# Patient Record
Sex: FEMALE | Race: BLACK OR AFRICAN AMERICAN | NOT HISPANIC OR LATINO | Employment: STUDENT | URBAN - METROPOLITAN AREA
[De-identification: names, ages, dates, MRNs, and addresses within clinical notes are randomized per-mention and may not be internally consistent; named-entity substitution may affect disease eponyms.]

---

## 2023-06-12 ENCOUNTER — OFFICE VISIT (OUTPATIENT)
Dept: PRIMARY CARE | Facility: CLINIC | Age: 23
End: 2023-06-12
Payer: COMMERCIAL

## 2023-06-12 VITALS
DIASTOLIC BLOOD PRESSURE: 71 MMHG | HEART RATE: 86 BPM | HEIGHT: 60 IN | BODY MASS INDEX: 23.56 KG/M2 | SYSTOLIC BLOOD PRESSURE: 96 MMHG | WEIGHT: 120 LBS

## 2023-06-12 DIAGNOSIS — Z00.00 WELL ADULT HEALTH CHECK: Primary | ICD-10-CM

## 2023-06-12 DIAGNOSIS — Z30.41 FAMILY PLANNING, BCP (BIRTH CONTROL PILLS) MAINTENANCE: ICD-10-CM

## 2023-06-12 PROBLEM — N30.00 CYSTITIS, ACUTE: Status: ACTIVE | Noted: 2023-06-12

## 2023-06-12 PROBLEM — D64.9 ANEMIA: Status: ACTIVE | Noted: 2023-06-12

## 2023-06-12 LAB — HEPATITIS B VIRUS SURFACE AB (MIU/ML) IN SERUM: <3.1 MIU/ML

## 2023-06-12 PROCEDURE — 1036F TOBACCO NON-USER: CPT | Performed by: FAMILY MEDICINE

## 2023-06-12 PROCEDURE — 99395 PREV VISIT EST AGE 18-39: CPT | Performed by: FAMILY MEDICINE

## 2023-06-12 PROCEDURE — 86706 HEP B SURFACE ANTIBODY: CPT

## 2023-06-12 PROCEDURE — 86481 TB AG RESPONSE T-CELL SUSP: CPT

## 2023-06-12 RX ORDER — NORGESTIMATE AND ETHINYL ESTRADIOL 7DAYSX3 28
KIT ORAL
COMMUNITY
End: 2023-06-12 | Stop reason: SDUPTHER

## 2023-06-12 RX ORDER — NORGESTIMATE AND ETHINYL ESTRADIOL 7DAYSX3 28
1 KIT ORAL DAILY
Qty: 90 TABLET | Refills: 3 | Status: SHIPPED | OUTPATIENT
Start: 2023-06-12 | End: 2026-08-25

## 2023-06-12 ASSESSMENT — ENCOUNTER SYMPTOMS
NEUROLOGICAL NEGATIVE: 1
RESPIRATORY NEGATIVE: 1
CONSTITUTIONAL NEGATIVE: 1
CARDIOVASCULAR NEGATIVE: 1
MUSCULOSKELETAL NEGATIVE: 1
GASTROINTESTINAL NEGATIVE: 1

## 2023-06-12 NOTE — PROGRESS NOTES
Subjective   Patient ID: Ifeanyi Lowery is a 22 y.o. female who presents for No chief complaint on file..    HPI well check     Review of Systems   Constitutional: Negative.    HENT: Negative.     Respiratory: Negative.     Cardiovascular: Negative.    Gastrointestinal: Negative.    Musculoskeletal: Negative.    Neurological: Negative.        Objective   BP 96/71   Pulse 86   Ht 1.524 m (5')   Wt 54.4 kg (120 lb)   BMI 23.44 kg/m²     Physical Exam  Vitals reviewed.   Constitutional:       Appearance: Normal appearance. She is normal weight.   Eyes:      Extraocular Movements: Extraocular movements intact.      Conjunctiva/sclera: Conjunctivae normal.      Pupils: Pupils are equal, round, and reactive to light.   Cardiovascular:      Rate and Rhythm: Normal rate and regular rhythm.      Pulses: Normal pulses.      Heart sounds: Normal heart sounds.   Pulmonary:      Effort: Pulmonary effort is normal.      Breath sounds: Normal breath sounds.   Abdominal:      General: Bowel sounds are normal.      Palpations: Abdomen is soft.   Musculoskeletal:         General: Normal range of motion.   Skin:     General: Skin is warm and dry.   Neurological:      General: No focal deficit present.      Mental Status: She is alert and oriented to person, place, and time. Mental status is at baseline.         Assessment/Plan   Problem List Items Addressed This Visit    None  Visit Diagnoses       Well adult health check    -  Primary    Relevant Orders    Hepatitis B surface antibody    T-Spot TB    Family planning, BCP (birth control pills) maintenance        Relevant Medications    norgestimate-ethinyl estradioL (Tri-Linyah) 0.18/0.215/0.25 mg-35 mcg (28) tablet

## 2023-06-13 ENCOUNTER — TELEPHONE (OUTPATIENT)
Dept: PRIMARY CARE | Facility: CLINIC | Age: 23
End: 2023-06-13
Payer: COMMERCIAL

## 2023-06-15 LAB
NIL(NEG) CONTROL SPOT COUNT: NORMAL
PANEL A SPOT COUNT: 0
PANEL B SPOT COUNT: 0
POS CONTROL SPOT COUNT: NORMAL
T-SPOT. TB INTERPRETATION: NEGATIVE

## 2023-10-13 ENCOUNTER — OFFICE VISIT (OUTPATIENT)
Dept: PRIMARY CARE | Facility: CLINIC | Age: 23
End: 2023-10-13
Payer: COMMERCIAL

## 2023-10-13 VITALS
DIASTOLIC BLOOD PRESSURE: 82 MMHG | HEART RATE: 100 BPM | HEIGHT: 60 IN | WEIGHT: 124 LBS | SYSTOLIC BLOOD PRESSURE: 108 MMHG | BODY MASS INDEX: 24.35 KG/M2

## 2023-10-13 DIAGNOSIS — Z23 NEED FOR TDAP VACCINATION: ICD-10-CM

## 2023-10-13 DIAGNOSIS — D75.89 RED BLOOD CELL DISORDER: ICD-10-CM

## 2023-10-13 DIAGNOSIS — N92.0 MENORRHAGIA WITH REGULAR CYCLE: Primary | ICD-10-CM

## 2023-10-13 DIAGNOSIS — D50.0 IRON DEFICIENCY ANEMIA DUE TO CHRONIC BLOOD LOSS: ICD-10-CM

## 2023-10-13 DIAGNOSIS — D72.9 WHITE BLOOD CELL DISORDER: ICD-10-CM

## 2023-10-13 LAB
BASOPHILS # BLD AUTO: 0.05 X10*3/UL (ref 0–0.1)
BASOPHILS NFR BLD AUTO: 0.9 %
EOSINOPHIL # BLD AUTO: 0.09 X10*3/UL (ref 0–0.7)
EOSINOPHIL NFR BLD AUTO: 1.7 %
ERYTHROCYTE [DISTWIDTH] IN BLOOD BY AUTOMATED COUNT: 12.8 % (ref 11.5–14.5)
HCT VFR BLD AUTO: 33.4 % (ref 36–46)
HGB BLD-MCNC: 10.8 G/DL (ref 12–16)
IMM GRANULOCYTES # BLD AUTO: 0.01 X10*3/UL (ref 0–0.7)
IMM GRANULOCYTES NFR BLD AUTO: 0.2 % (ref 0–0.9)
IRON SATN MFR SERPL: 35 % (ref 25–45)
IRON SERPL-MCNC: 133 UG/DL (ref 35–150)
LYMPHOCYTES # BLD AUTO: 2.47 X10*3/UL (ref 1.2–4.8)
LYMPHOCYTES NFR BLD AUTO: 46.2 %
MCH RBC QN AUTO: 31.3 PG (ref 26–34)
MCHC RBC AUTO-ENTMCNC: 32.3 G/DL (ref 32–36)
MCV RBC AUTO: 97 FL (ref 80–100)
MONOCYTES # BLD AUTO: 0.58 X10*3/UL (ref 0.1–1)
MONOCYTES NFR BLD AUTO: 10.8 %
NEUTROPHILS # BLD AUTO: 2.15 X10*3/UL (ref 1.2–7.7)
NEUTROPHILS NFR BLD AUTO: 40.2 %
NRBC BLD-RTO: 0 /100 WBCS (ref 0–0)
PLATELET # BLD AUTO: 330 X10*3/UL (ref 150–450)
PMV BLD AUTO: 10.5 FL (ref 7.5–11.5)
RBC # BLD AUTO: 3.45 X10*6/UL (ref 4–5.2)
TIBC SERPL-MCNC: 382 UG/DL (ref 240–445)
UIBC SERPL-MCNC: 249 UG/DL (ref 110–370)
WBC # BLD AUTO: 5.4 X10*3/UL (ref 4.4–11.3)

## 2023-10-13 PROCEDURE — 1036F TOBACCO NON-USER: CPT | Performed by: FAMILY MEDICINE

## 2023-10-13 PROCEDURE — 90471 IMMUNIZATION ADMIN: CPT | Performed by: FAMILY MEDICINE

## 2023-10-13 PROCEDURE — 83540 ASSAY OF IRON: CPT

## 2023-10-13 PROCEDURE — 83550 IRON BINDING TEST: CPT

## 2023-10-13 PROCEDURE — 36415 COLL VENOUS BLD VENIPUNCTURE: CPT

## 2023-10-13 PROCEDURE — 99213 OFFICE O/P EST LOW 20 MIN: CPT | Performed by: FAMILY MEDICINE

## 2023-10-13 PROCEDURE — 90715 TDAP VACCINE 7 YRS/> IM: CPT | Performed by: FAMILY MEDICINE

## 2023-10-13 PROCEDURE — 85025 COMPLETE CBC W/AUTO DIFF WBC: CPT

## 2023-10-13 ASSESSMENT — ENCOUNTER SYMPTOMS
NEUROLOGICAL NEGATIVE: 1
CARDIOVASCULAR NEGATIVE: 1
CONSTITUTIONAL NEGATIVE: 1
RESPIRATORY NEGATIVE: 1
GASTROINTESTINAL NEGATIVE: 1
MUSCULOSKELETAL NEGATIVE: 1

## 2023-10-13 NOTE — PATIENT INSTRUCTIONS
OB-GYN    Female  Dr Eleonora Blandon -    [phone]  Dr Geeta Cardona -                  [phone]  Male  Dr Mooney -                      [phone]  Dr Meza -                   [phone]

## 2023-10-13 NOTE — PROGRESS NOTES
Subjective   Patient ID: Ifeanyi Lowery is a 23 y.o. female who presents for No chief complaint on file..    HPI heavy periods , pt stoopped bcp due to concern w possible side effects, would like to see obgyn, is not taking iron pill and last period was 7 days w hx of anemia    Review of Systems   Constitutional: Negative.    HENT: Negative.     Respiratory: Negative.     Cardiovascular: Negative.    Gastrointestinal: Negative.    Genitourinary:  Positive for menstrual problem.   Musculoskeletal: Negative.    Neurological: Negative.        Objective   /82   Pulse 100   Ht 1.524 m (5')   Wt 56.2 kg (124 lb)   BMI 24.22 kg/m²     Physical Exam  Vitals reviewed.   Constitutional:       Appearance: Normal appearance. She is normal weight.   Eyes:      Extraocular Movements: Extraocular movements intact.      Conjunctiva/sclera: Conjunctivae normal.      Pupils: Pupils are equal, round, and reactive to light.   Cardiovascular:      Rate and Rhythm: Normal rate and regular rhythm.      Pulses: Normal pulses.      Heart sounds: Normal heart sounds.   Pulmonary:      Effort: Pulmonary effort is normal.      Breath sounds: Normal breath sounds.   Abdominal:      General: Bowel sounds are normal.      Palpations: Abdomen is soft.   Musculoskeletal:         General: Normal range of motion.   Skin:     General: Skin is warm and dry.   Neurological:      General: No focal deficit present.      Mental Status: She is alert and oriented to person, place, and time. Mental status is at baseline.       Assessment/Plan   Problem List Items Addressed This Visit             ICD-10-CM    Anemia D64.9    Relevant Orders    CBC and Auto Differential    Iron and TIBC     Other Visit Diagnoses         Codes    Menorrhagia with regular cycle    -  Primary N92.0    Relevant Orders    CBC and Auto Differential    Iron and TIBC    Need for Tdap vaccination     Z23    Relevant Orders    Tdap vaccine, age 7 years and older (Completed)         Refer to obgyn

## 2023-10-13 NOTE — PROGRESS NOTES
Pt comes in bc she wants to change her bc. Pt also unsure if needs a vaccine for school? Pt stopped what she was on bc it was giving her pains in her legs. Pt states she stopped it about 3 weeks ago.

## 2023-10-16 ENCOUNTER — TELEPHONE (OUTPATIENT)
Dept: PRIMARY CARE | Facility: CLINIC | Age: 23
End: 2023-10-16
Payer: COMMERCIAL

## 2023-10-16 NOTE — TELEPHONE ENCOUNTER
Spoke with patient      ----- Message from Nelson Bernardo MD sent at 10/15/2023 11:55 AM EDT -----  Please call the patient regarding her abnormal result.  Bakari pt about anemia , it is stable from past but due to its lack of improvement I would like to refer her to dr leon  or dr madrid, hematology for eval   [phone]- for either, not an urgent consult- I put in referral

## 2024-01-23 ENCOUNTER — OFFICE VISIT (OUTPATIENT)
Dept: PRIMARY CARE | Facility: CLINIC | Age: 24
End: 2024-01-23
Payer: COMMERCIAL

## 2024-01-23 VITALS
BODY MASS INDEX: 24.35 KG/M2 | HEIGHT: 60 IN | HEART RATE: 110 BPM | DIASTOLIC BLOOD PRESSURE: 85 MMHG | WEIGHT: 124 LBS | SYSTOLIC BLOOD PRESSURE: 127 MMHG

## 2024-01-23 DIAGNOSIS — Z20.2 POSSIBLE EXPOSURE TO STD: ICD-10-CM

## 2024-01-23 DIAGNOSIS — N92.0 MENORRHAGIA WITH REGULAR CYCLE: ICD-10-CM

## 2024-01-23 DIAGNOSIS — D50.0 IRON DEFICIENCY ANEMIA DUE TO CHRONIC BLOOD LOSS: Primary | ICD-10-CM

## 2024-01-23 LAB
ALBUMIN SERPL BCP-MCNC: 4.4 G/DL (ref 3.4–5)
ALP SERPL-CCNC: 78 U/L (ref 33–110)
ALT SERPL W P-5'-P-CCNC: 19 U/L (ref 7–45)
ANION GAP SERPL CALC-SCNC: 16 MMOL/L (ref 10–20)
AST SERPL W P-5'-P-CCNC: 17 U/L (ref 9–39)
BASOPHILS # BLD AUTO: 0.04 X10*3/UL (ref 0–0.1)
BASOPHILS NFR BLD AUTO: 0.7 %
BILIRUB SERPL-MCNC: 0.3 MG/DL (ref 0–1.2)
BUN SERPL-MCNC: 9 MG/DL (ref 6–23)
CALCIUM SERPL-MCNC: 9.2 MG/DL (ref 8.6–10.6)
CHLORIDE SERPL-SCNC: 101 MMOL/L (ref 98–107)
CO2 SERPL-SCNC: 24 MMOL/L (ref 21–32)
CREAT SERPL-MCNC: 0.64 MG/DL (ref 0.5–1.05)
EGFRCR SERPLBLD CKD-EPI 2021: >90 ML/MIN/1.73M*2
EOSINOPHIL # BLD AUTO: 0.1 X10*3/UL (ref 0–0.7)
EOSINOPHIL NFR BLD AUTO: 1.8 %
ERYTHROCYTE [DISTWIDTH] IN BLOOD BY AUTOMATED COUNT: 13.5 % (ref 11.5–14.5)
GLUCOSE SERPL-MCNC: 70 MG/DL (ref 74–99)
HCT VFR BLD AUTO: 34.7 % (ref 36–46)
HGB BLD-MCNC: 11 G/DL (ref 12–16)
HIV 1+2 AB+HIV1 P24 AG SERPL QL IA: NONREACTIVE
IMM GRANULOCYTES # BLD AUTO: 0.01 X10*3/UL (ref 0–0.7)
IMM GRANULOCYTES NFR BLD AUTO: 0.2 % (ref 0–0.9)
IRON SATN MFR SERPL: 13 % (ref 25–45)
IRON SERPL-MCNC: 48 UG/DL (ref 35–150)
LYMPHOCYTES # BLD AUTO: 2.25 X10*3/UL (ref 1.2–4.8)
LYMPHOCYTES NFR BLD AUTO: 39.5 %
MCH RBC QN AUTO: 31.2 PG (ref 26–34)
MCHC RBC AUTO-ENTMCNC: 31.7 G/DL (ref 32–36)
MCV RBC AUTO: 98 FL (ref 80–100)
MONOCYTES # BLD AUTO: 0.75 X10*3/UL (ref 0.1–1)
MONOCYTES NFR BLD AUTO: 13.2 %
NEUTROPHILS # BLD AUTO: 2.54 X10*3/UL (ref 1.2–7.7)
NEUTROPHILS NFR BLD AUTO: 44.6 %
NRBC BLD-RTO: 0 /100 WBCS (ref 0–0)
PLATELET # BLD AUTO: 319 X10*3/UL (ref 150–450)
POTASSIUM SERPL-SCNC: 3.9 MMOL/L (ref 3.5–5.3)
PROT SERPL-MCNC: 7.8 G/DL (ref 6.4–8.2)
RBC # BLD AUTO: 3.53 X10*6/UL (ref 4–5.2)
SODIUM SERPL-SCNC: 137 MMOL/L (ref 136–145)
TIBC SERPL-MCNC: 359 UG/DL (ref 240–445)
UIBC SERPL-MCNC: 311 UG/DL (ref 110–370)
WBC # BLD AUTO: 5.7 X10*3/UL (ref 4.4–11.3)

## 2024-01-23 PROCEDURE — 87389 HIV-1 AG W/HIV-1&-2 AB AG IA: CPT

## 2024-01-23 PROCEDURE — 87800 DETECT AGNT MULT DNA DIREC: CPT

## 2024-01-23 PROCEDURE — 83550 IRON BINDING TEST: CPT

## 2024-01-23 PROCEDURE — 85025 COMPLETE CBC W/AUTO DIFF WBC: CPT

## 2024-01-23 PROCEDURE — 99214 OFFICE O/P EST MOD 30 MIN: CPT | Performed by: FAMILY MEDICINE

## 2024-01-23 PROCEDURE — 80053 COMPREHEN METABOLIC PANEL: CPT

## 2024-01-23 PROCEDURE — 83540 ASSAY OF IRON: CPT

## 2024-01-23 PROCEDURE — 1036F TOBACCO NON-USER: CPT | Performed by: FAMILY MEDICINE

## 2024-01-23 PROCEDURE — 36415 COLL VENOUS BLD VENIPUNCTURE: CPT

## 2024-01-23 ASSESSMENT — ENCOUNTER SYMPTOMS
NEUROLOGICAL NEGATIVE: 1
DEPRESSION: 0
LOSS OF SENSATION IN FEET: 0
CONSTITUTIONAL NEGATIVE: 1
RESPIRATORY NEGATIVE: 1
GASTROINTESTINAL NEGATIVE: 1
OCCASIONAL FEELINGS OF UNSTEADINESS: 0
CARDIOVASCULAR NEGATIVE: 1
MUSCULOSKELETAL NEGATIVE: 1

## 2024-01-23 NOTE — PROGRESS NOTES
Pt comes in for std testing. Pt states she has a new partner and wanted checked. Pt states had low rbcs and was taking otc iron and then it interacted with her ibs. So then was giving her hemorrhoid flares really bad. Pt wanted to see if there was any other kind of iron she can take besides for pills?

## 2024-01-23 NOTE — PROGRESS NOTES
Subjective   Patient ID: Ifeanyi Lowery is a 23 y.o. female who presents for No chief complaint on file..    HPI pt w anemia, concern for std exposure, menomethoragia    Review of Systems   Constitutional: Negative.    HENT: Negative.     Respiratory: Negative.     Cardiovascular: Negative.    Gastrointestinal: Negative.    Musculoskeletal: Negative.    Neurological: Negative.        Objective   /85   Pulse 110   Ht 1.524 m (5')   Wt 56.2 kg (124 lb)   BMI 24.22 kg/m²     Physical Exam  Vitals reviewed.   Constitutional:       Appearance: Normal appearance. She is normal weight.   Eyes:      Extraocular Movements: Extraocular movements intact.      Conjunctiva/sclera: Conjunctivae normal.      Pupils: Pupils are equal, round, and reactive to light.   Cardiovascular:      Rate and Rhythm: Normal rate and regular rhythm.      Pulses: Normal pulses.      Heart sounds: Normal heart sounds.   Pulmonary:      Effort: Pulmonary effort is normal.      Breath sounds: Normal breath sounds.   Abdominal:      General: Bowel sounds are normal.      Palpations: Abdomen is soft.   Musculoskeletal:         General: Normal range of motion.   Skin:     General: Skin is warm and dry.   Neurological:      General: No focal deficit present.      Mental Status: She is alert and oriented to person, place, and time. Mental status is at baseline.         Assessment/Plan   Problem List Items Addressed This Visit             ICD-10-CM    Anemia - Primary D64.9     Other Visit Diagnoses         Codes    Menorrhagia with regular cycle     N92.0    Possible exposure to STD     Z20.2          Discussed the risk of untreated anemia chronic from menorhagia and referred to ob-gyn  Std eval for pt  Check iron level for anemia

## 2024-01-24 LAB
C TRACH RRNA SPEC QL NAA+PROBE: NEGATIVE
N GONORRHOEA DNA SPEC QL PROBE+SIG AMP: NEGATIVE

## 2024-02-05 ENCOUNTER — APPOINTMENT (OUTPATIENT)
Dept: OBSTETRICS AND GYNECOLOGY | Facility: CLINIC | Age: 24
End: 2024-02-05
Payer: COMMERCIAL

## 2024-02-22 ENCOUNTER — APPOINTMENT (OUTPATIENT)
Dept: OBSTETRICS AND GYNECOLOGY | Facility: CLINIC | Age: 24
End: 2024-02-22
Payer: COMMERCIAL

## 2024-05-06 ENCOUNTER — HOSPITAL ENCOUNTER (EMERGENCY)
Facility: HOSPITAL | Age: 24
Discharge: HOME | End: 2024-05-06
Payer: COMMERCIAL

## 2024-05-06 VITALS
OXYGEN SATURATION: 99 % | TEMPERATURE: 97.4 F | HEIGHT: 60 IN | SYSTOLIC BLOOD PRESSURE: 135 MMHG | BODY MASS INDEX: 23.56 KG/M2 | DIASTOLIC BLOOD PRESSURE: 83 MMHG | RESPIRATION RATE: 16 BRPM | WEIGHT: 120 LBS | HEART RATE: 82 BPM

## 2024-05-06 DIAGNOSIS — N92.6 IRREGULAR MENSTRUATION: ICD-10-CM

## 2024-05-06 DIAGNOSIS — Z32.02 NEGATIVE PREGNANCY TEST: Primary | ICD-10-CM

## 2024-05-06 LAB
APPEARANCE UR: CLEAR
BACTERIA #/AREA URNS AUTO: ABNORMAL /HPF
BILIRUB UR STRIP.AUTO-MCNC: NEGATIVE MG/DL
CLUE CELLS SPEC QL WET PREP: NORMAL
COLOR UR: COLORLESS
GLUCOSE UR STRIP.AUTO-MCNC: NORMAL MG/DL
HOLD SPECIMEN: NORMAL
KETONES UR STRIP.AUTO-MCNC: NEGATIVE MG/DL
LEUKOCYTE ESTERASE UR QL STRIP.AUTO: NEGATIVE
NITRITE UR QL STRIP.AUTO: NEGATIVE
PH UR STRIP.AUTO: 6.5 [PH]
PREGNANCY TEST URINE, POC: NEGATIVE
PROT UR STRIP.AUTO-MCNC: NEGATIVE MG/DL
RBC # UR STRIP.AUTO: ABNORMAL /UL
RBC #/AREA URNS AUTO: ABNORMAL /HPF
SP GR UR STRIP.AUTO: 1
SQUAMOUS #/AREA URNS AUTO: ABNORMAL /HPF
T VAGINALIS SPEC QL WET PREP: NORMAL
TRICHOMONAS REFLEX COMMENT: NORMAL
UROBILINOGEN UR STRIP.AUTO-MCNC: NORMAL MG/DL
WBC #/AREA URNS AUTO: ABNORMAL /HPF
WBC VAG QL WET PREP: NORMAL
YEAST VAG QL WET PREP: NORMAL

## 2024-05-06 PROCEDURE — 87800 DETECT AGNT MULT DNA DIREC: CPT | Performed by: PHYSICIAN ASSISTANT

## 2024-05-06 PROCEDURE — 99283 EMERGENCY DEPT VISIT LOW MDM: CPT

## 2024-05-06 PROCEDURE — 99284 EMERGENCY DEPT VISIT MOD MDM: CPT | Performed by: PHYSICIAN ASSISTANT

## 2024-05-06 PROCEDURE — 2500000005 HC RX 250 GENERAL PHARMACY W/O HCPCS: Performed by: PHYSICIAN ASSISTANT

## 2024-05-06 PROCEDURE — 87661 TRICHOMONAS VAGINALIS AMPLIF: CPT | Performed by: PHYSICIAN ASSISTANT

## 2024-05-06 PROCEDURE — 87210 SMEAR WET MOUNT SALINE/INK: CPT | Mod: 59 | Performed by: PHYSICIAN ASSISTANT

## 2024-05-06 PROCEDURE — 81003 URINALYSIS AUTO W/O SCOPE: CPT | Performed by: PHYSICIAN ASSISTANT

## 2024-05-06 PROCEDURE — 81025 URINE PREGNANCY TEST: CPT

## 2024-05-06 RX ORDER — ONDANSETRON 4 MG/1
4 TABLET, FILM COATED ORAL ONCE
Status: COMPLETED | OUTPATIENT
Start: 2024-05-06 | End: 2024-05-06

## 2024-05-06 RX ORDER — IBUPROFEN 600 MG/1
600 TABLET ORAL EVERY 6 HOURS PRN
Qty: 20 TABLET | Refills: 0 | Status: SHIPPED | OUTPATIENT
Start: 2024-05-06 | End: 2024-05-11

## 2024-05-06 RX ORDER — ACETAMINOPHEN 325 MG/1
650 TABLET ORAL EVERY 6 HOURS PRN
Qty: 20 TABLET | Refills: 0 | Status: SHIPPED | OUTPATIENT
Start: 2024-05-06 | End: 2024-05-11

## 2024-05-06 RX ORDER — ONDANSETRON 4 MG/1
4 TABLET, ORALLY DISINTEGRATING ORAL EVERY 8 HOURS PRN
Qty: 21 TABLET | Refills: 0 | Status: SHIPPED | OUTPATIENT
Start: 2024-05-06

## 2024-05-06 RX ORDER — ACETAMINOPHEN 325 MG/1
975 TABLET ORAL ONCE
Status: COMPLETED | OUTPATIENT
Start: 2024-05-06 | End: 2024-05-06

## 2024-05-06 RX ADMIN — ONDANSETRON HYDROCHLORIDE 4 MG: 4 TABLET, FILM COATED ORAL at 12:02

## 2024-05-06 RX ADMIN — ACETAMINOPHEN 975 MG: 325 TABLET ORAL at 12:02

## 2024-05-06 ASSESSMENT — COLUMBIA-SUICIDE SEVERITY RATING SCALE - C-SSRS
6. HAVE YOU EVER DONE ANYTHING, STARTED TO DO ANYTHING, OR PREPARED TO DO ANYTHING TO END YOUR LIFE?: NO
1. IN THE PAST MONTH, HAVE YOU WISHED YOU WERE DEAD OR WISHED YOU COULD GO TO SLEEP AND NOT WAKE UP?: NO
2. HAVE YOU ACTUALLY HAD ANY THOUGHTS OF KILLING YOURSELF?: NO

## 2024-05-06 ASSESSMENT — PAIN SCALES - GENERAL: PAINLEVEL_OUTOF10: 8

## 2024-05-06 ASSESSMENT — PAIN DESCRIPTION - PAIN TYPE: TYPE: ACUTE PAIN

## 2024-05-06 ASSESSMENT — PAIN DESCRIPTION - DESCRIPTORS: DESCRIPTORS: CRAMPING;TIGHTNESS

## 2024-05-06 ASSESSMENT — PAIN - FUNCTIONAL ASSESSMENT: PAIN_FUNCTIONAL_ASSESSMENT: 0-10

## 2024-05-06 ASSESSMENT — PAIN DESCRIPTION - LOCATION: LOCATION: ABDOMEN

## 2024-05-06 NOTE — Clinical Note
Ifeanyi Lowery was seen and treated in our emergency department on 5/6/2024.  She may return to work on 05/07/2024.       If you have any questions or concerns, please don't hesitate to call.      Nydia Green PA-C

## 2024-05-06 NOTE — ED PROVIDER NOTES
This is a 23-year-old female with past medical history of anemia who presents to the ED with pelvic pain as well as vaginal spotting that began today.  She states that she is concerned she may be pregnant because she had a positive pregnancy test 6 days ago.  She states that she also  took a pregnancy test yesterday which was negative.  She denies any heavy vaginal bleeding or bleeding through multiple pads/tampons per hour.  She denies any vomiting, diarrhea, constipation, vaginal discharge, or urinary symptoms.  She does endorse some mild nausea.  She states that her first day of her last menstrual cycle was mid March, however she is unsure of the exact day.  She denies any history of irregular menstrual cycles.  She has not seen OB/GYN for this problem.  She denies taking anything at home for her symptoms.  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               Visit Vitals  /83   Pulse 82   Temp 36.3 °C (97.4 °F) (Tympanic)   Resp 16   Ht 1.524 m (5')   Wt 54.4 kg (120 lb)   SpO2 99%   BMI 23.44 kg/m²   Smoking Status Never   BSA 1.52 m²          Physical Exam     Physical exam:    General: Vitals noted, no distress. Afebrile.    EENT: PERRL, EOM's intact. Eye lids without lesions. No scleral icterus. Normal phonation. Nares patent. MMM.     Cardiac: Regular, rate, rhythm, no murmur.    Pulmonary: Lungs clear bilaterally with good aeration. No adventitious breath sounds.    Abdomen: Tender to palpation suprapubic region, soft, nonsurgical. No peritoneal signs. Normoactive bowel sounds. No CVA tenderness.     Pelvic Exam: Chaperone present. External genitalia normal. No rashes or lesions. Speculum exam shows no lesions on the cervix.  Small amounts of dark red/brown blood and discharge within the vagina. Closed cervical os. Bimanual exam shows no adnexal pain or mass. No cervical motion tenderness.    Extremities: No peripheral edema. Full range of  motion. Moves all extremities freely.     Skin: No rash. Warm and dry. No discoloration noted.     Neuro: No focal neurologic deficits noted.  Pt is A&O x3, speech is clear, moves all extremities independently sensation is intact.          Labs Reviewed   POCT PREGNANCY, URINE - Normal       Result Value    Preg Test, Ur Negative     TRICHOMONAS WET PREP REFLEX TO PCR   C. TRACHOMATIS + N. GONORRHOEAE, AMPLIFIED   URINALYSIS WITH REFLEX CULTURE AND MICROSCOPIC    Narrative:     The following orders were created for panel order Urinalysis with Reflex Culture and Microscopic.  Procedure                               Abnormality         Status                     ---------                               -----------         ------                     Urinalysis with Reflex C...[[id number]]                                                 Extra Urine Gray Tube[[id number]]                                                         Please view results for these tests on the individual orders.   URINALYSIS WITH REFLEX CULTURE AND MICROSCOPIC   EXTRA URINE GRAY TUBE       No orders to display         ED Course & MDM     Medical Decision Making  This is a 23-year-old female past medical history of anemia who presents to the ED with concern for possible pregnancy due to having both positive and negative pregnancy test at home as well as pelvic pain and vaginal bleeding.  Vital stable upon arrival to the ED.  On examination she was tender to palpation to her midline suprapubic region.  No CVA tenderness.  No rebound or guarding.  No tenderness at McBurney's point.  Pelvic examination performed chaperone present which showed small mount of blood within the vagina without any visible clots or products of conception.  No cervical motion tenderness.  No adnexal pain or masses palpated.  Wet prep as well as GC and chlamydia swabs obtained during pelvic examination.  Urinalysis ordered.  Urine pregnancy test obtained and found to be  negative today.  She was informed of this result.  I offered blood testing for hCG, however she declined.  She was medicated with Tylenol and Zofran for her symptoms.  On reevaluation she was feeling improved.  Urinalysis positive for +1 bacteria but nitrate and leukocyte Estrace negative and patient denied any urinary symptoms so I have low suspicion for true UTI and this is likely contaminated sample.  Urine was sent for culture if patient does develop symptoms.  Wet prep negative.  I discussed all these results with the patient.  I did recommend close follow-up with OB/GYN.  I discussed the patient if she is still concerned for pregnancy she should repeat a home pregnancy test in 1 week.  She was agreeable to this plan.  She was written prescriptions for Motrin, Tylenol, and Zofran for her symptoms at home.  She was given OB/GYN follow-up information.  She was provided with a note for her job.  She was agreeable to this plan and had no further questions at this time and was discharged from the ED in stable condition.    Amount and/or Complexity of Data Reviewed  Labs: ordered.    Risk  Prescription drug management.         Diagnoses as of 05/06/24 1305   Negative pregnancy test   Irregular menstruation       Procedures    YULIYA Webb, PANOAH Green PA-C  05/06/24 1306

## 2024-05-07 LAB
C TRACH RRNA SPEC QL NAA+PROBE: NEGATIVE
N GONORRHOEA DNA SPEC QL PROBE+SIG AMP: NEGATIVE

## 2024-05-08 LAB — T VAGINALIS RRNA SPEC QL NAA+PROBE: NEGATIVE

## 2024-06-14 ENCOUNTER — APPOINTMENT (OUTPATIENT)
Dept: PRIMARY CARE | Facility: CLINIC | Age: 24
End: 2024-06-14
Payer: COMMERCIAL

## 2024-06-17 ENCOUNTER — OFFICE VISIT (OUTPATIENT)
Dept: PRIMARY CARE | Facility: CLINIC | Age: 24
End: 2024-06-17
Payer: COMMERCIAL

## 2024-06-17 ENCOUNTER — CLINICAL SUPPORT (OUTPATIENT)
Dept: PRIMARY CARE | Facility: CLINIC | Age: 24
End: 2024-06-17
Payer: COMMERCIAL

## 2024-06-17 DIAGNOSIS — Z11.1 PPD SCREENING TEST: ICD-10-CM

## 2024-06-17 PROCEDURE — 86580 TB INTRADERMAL TEST: CPT | Performed by: FAMILY MEDICINE

## 2024-06-19 LAB
INDURATION: NORMAL MM
TB SKIN TEST: NEGATIVE

## 2024-06-20 ENCOUNTER — TELEPHONE (OUTPATIENT)
Dept: PRIMARY CARE | Facility: CLINIC | Age: 24
End: 2024-06-20
Payer: COMMERCIAL

## 2024-08-01 ENCOUNTER — OFFICE VISIT (OUTPATIENT)
Dept: OBSTETRICS AND GYNECOLOGY | Facility: CLINIC | Age: 24
End: 2024-08-01
Payer: COMMERCIAL

## 2024-08-01 VITALS
HEIGHT: 60 IN | WEIGHT: 120 LBS | BODY MASS INDEX: 23.56 KG/M2 | SYSTOLIC BLOOD PRESSURE: 104 MMHG | DIASTOLIC BLOOD PRESSURE: 62 MMHG

## 2024-08-01 DIAGNOSIS — N64.4 BREAST PAIN, LEFT: ICD-10-CM

## 2024-08-01 PROCEDURE — 3008F BODY MASS INDEX DOCD: CPT | Performed by: OBSTETRICS & GYNECOLOGY

## 2024-08-01 PROCEDURE — 1036F TOBACCO NON-USER: CPT | Performed by: OBSTETRICS & GYNECOLOGY

## 2024-08-01 PROCEDURE — 99203 OFFICE O/P NEW LOW 30 MIN: CPT | Performed by: OBSTETRICS & GYNECOLOGY

## 2024-08-01 RX ORDER — CEPHALEXIN 500 MG/1
TABLET ORAL
Qty: 15 TABLET | Refills: 0 | Status: SHIPPED | OUTPATIENT
Start: 2024-08-01

## 2024-08-01 NOTE — PROGRESS NOTES
Subjective   Patient ID: Ifeanyi Lowery is a 24 y.o. female who presents for Breast Problem (New Patient - Breast Problem//C/o  left breast pain with possible lump under nipple//Chaperone declined/).  HPI  Patient denies fever or chills.  Review of Systems  10 systems have been reviewed and are negative and noncontributory to the patient current ailments.    Objective   Physical Exam  Vital signs reviewed    CONSTITUTIONAL- well nourished, well developed, looks like stated age.  She is sitting comfortably on the exam table in no apparent distress      BREASTS-normal appearance, no skin changes or nipple discharge.  Palpation of the breast and axillae: No palpable mass and no axillary lymphadenopathy right breast.  There is however a 1.5 x 1.5 cm mobile mass just underneath the areola of the left breast.  No nipple discharge was seen or expressed.    Diagnoses and all orders for this visit:  Breast pain, left  Left breast ultrasound for further delineation.  Will place the patient on a 5-day course of Keflex.  -Patient will consult with general surgery Dr. Leidy Victoria DO 08/01/24 12:48 PM

## 2024-08-15 ENCOUNTER — HOSPITAL ENCOUNTER (OUTPATIENT)
Dept: RADIOLOGY | Facility: CLINIC | Age: 24
Discharge: HOME | End: 2024-08-15
Payer: COMMERCIAL

## 2024-08-15 DIAGNOSIS — N64.4 BREAST PAIN, LEFT: ICD-10-CM

## 2024-08-15 PROCEDURE — 76642 ULTRASOUND BREAST LIMITED: CPT | Mod: LT

## 2024-08-15 PROCEDURE — 76981 USE PARENCHYMA: CPT | Mod: LT

## 2024-08-28 ENCOUNTER — OFFICE VISIT (OUTPATIENT)
Dept: OBSTETRICS AND GYNECOLOGY | Facility: CLINIC | Age: 24
End: 2024-08-28
Payer: COMMERCIAL

## 2024-08-28 VITALS
WEIGHT: 123 LBS | SYSTOLIC BLOOD PRESSURE: 130 MMHG | DIASTOLIC BLOOD PRESSURE: 78 MMHG | HEIGHT: 61 IN | BODY MASS INDEX: 23.22 KG/M2

## 2024-08-28 DIAGNOSIS — Z87.440 HISTORY OF CHRONIC URINARY TRACT INFECTION: ICD-10-CM

## 2024-08-28 DIAGNOSIS — N61.0 INFECTION OF LEFT BREAST: ICD-10-CM

## 2024-08-28 DIAGNOSIS — Z11.3 SCREEN FOR STD (SEXUALLY TRANSMITTED DISEASE): ICD-10-CM

## 2024-08-28 DIAGNOSIS — N89.8 VAGINAL DISCHARGE: ICD-10-CM

## 2024-08-28 DIAGNOSIS — R30.0 DYSURIA: ICD-10-CM

## 2024-08-28 DIAGNOSIS — Z01.419 ENCOUNTER FOR ANNUAL ROUTINE GYNECOLOGICAL EXAMINATION: Primary | ICD-10-CM

## 2024-08-28 LAB
POC APPEARANCE, URINE: ABNORMAL
POC BILIRUBIN, URINE: NEGATIVE
POC BLOOD, URINE: ABNORMAL
POC COLOR, URINE: YELLOW
POC GLUCOSE, URINE: NEGATIVE MG/DL
POC KETONES, URINE: NEGATIVE MG/DL
POC LEUKOCYTES, URINE: ABNORMAL
POC NITRITE,URINE: NEGATIVE
POC PH, URINE: 6 PH
POC PROTEIN, URINE: NEGATIVE MG/DL
POC SPECIFIC GRAVITY, URINE: 1.02
POC UROBILINOGEN, URINE: 0.2 EU/DL

## 2024-08-28 PROCEDURE — 99395 PREV VISIT EST AGE 18-39: CPT | Performed by: STUDENT IN AN ORGANIZED HEALTH CARE EDUCATION/TRAINING PROGRAM

## 2024-08-28 PROCEDURE — 3008F BODY MASS INDEX DOCD: CPT | Performed by: STUDENT IN AN ORGANIZED HEALTH CARE EDUCATION/TRAINING PROGRAM

## 2024-08-28 PROCEDURE — 87591 N.GONORRHOEAE DNA AMP PROB: CPT

## 2024-08-28 PROCEDURE — 81002 URINALYSIS NONAUTO W/O SCOPE: CPT | Performed by: STUDENT IN AN ORGANIZED HEALTH CARE EDUCATION/TRAINING PROGRAM

## 2024-08-28 PROCEDURE — 87661 TRICHOMONAS VAGINALIS AMPLIF: CPT

## 2024-08-28 PROCEDURE — 87086 URINE CULTURE/COLONY COUNT: CPT

## 2024-08-28 PROCEDURE — 87205 SMEAR GRAM STAIN: CPT

## 2024-08-28 PROCEDURE — 88175 CYTOPATH C/V AUTO FLUID REDO: CPT

## 2024-08-28 PROCEDURE — 1036F TOBACCO NON-USER: CPT | Performed by: STUDENT IN AN ORGANIZED HEALTH CARE EDUCATION/TRAINING PROGRAM

## 2024-08-28 PROCEDURE — 87491 CHLMYD TRACH DNA AMP PROBE: CPT

## 2024-08-28 RX ORDER — SULFAMETHOXAZOLE AND TRIMETHOPRIM 800; 160 MG/1; MG/1
1 TABLET ORAL 2 TIMES DAILY
Qty: 10 TABLET | Refills: 0 | Status: SHIPPED | OUTPATIENT
Start: 2024-08-28 | End: 2024-09-02

## 2024-08-28 SDOH — ECONOMIC STABILITY: FOOD INSECURITY: WITHIN THE PAST 12 MONTHS, THE FOOD YOU BOUGHT JUST DIDN'T LAST AND YOU DIDN'T HAVE MONEY TO GET MORE.: NEVER TRUE

## 2024-08-28 SDOH — ECONOMIC STABILITY: FOOD INSECURITY: WITHIN THE PAST 12 MONTHS, YOU WORRIED THAT YOUR FOOD WOULD RUN OUT BEFORE YOU GOT MONEY TO BUY MORE.: NEVER TRUE

## 2024-08-28 SDOH — ECONOMIC STABILITY: TRANSPORTATION INSECURITY
IN THE PAST 12 MONTHS, HAS THE LACK OF TRANSPORTATION KEPT YOU FROM MEDICAL APPOINTMENTS OR FROM GETTING MEDICATIONS?: NO

## 2024-08-28 SDOH — ECONOMIC STABILITY: TRANSPORTATION INSECURITY
IN THE PAST 12 MONTHS, HAS LACK OF TRANSPORTATION KEPT YOU FROM MEETINGS, WORK, OR FROM GETTING THINGS NEEDED FOR DAILY LIVING?: NO

## 2024-08-28 ASSESSMENT — PATIENT HEALTH QUESTIONNAIRE - PHQ9
1. LITTLE INTEREST OR PLEASURE IN DOING THINGS: NOT AT ALL
SUM OF ALL RESPONSES TO PHQ9 QUESTIONS 1 & 2: 0
2. FEELING DOWN, DEPRESSED OR HOPELESS: NOT AT ALL

## 2024-08-28 ASSESSMENT — ENCOUNTER SYMPTOMS
DEPRESSION: 0
LOSS OF SENSATION IN FEET: 0
DYSURIA: 1
OCCASIONAL FEELINGS OF UNSTEADINESS: 0

## 2024-08-28 ASSESSMENT — PAIN SCALES - GENERAL: PAINLEVEL: 0-NO PAIN

## 2024-08-28 NOTE — PROGRESS NOTES
Subjective   Patient ID: Ifeanyi Lowery is a 24 y.o. female who presents for Annual Exam (Pt here for annual exam/Pt c/o of lump/cyst on left breast had U/S on 8/15/2024 but pt states has grown since, pt also c/o of possible uti and BV/No pap hx/Pt requesting sti testing/Pt declines birth control/Pt states currently trying to conceive).  Pt here fo for annual visit with multiple complaints.  Main complaint is about left breast cysts.  She says that she recently had a breast ultrasound that resulted benign and was told that she had a breast infection was prescribed Keflex.  She did not start taking the antibiotic second opinion.  She is trying to conceive but has noticed changes in her vaginal discharge related to semen.  She declines contraception.  Would also like STI testing.  She is also complaining of dysuria and reports a history of chronic UTIs almost monthly.  She says that this is because she usually falls asleep after intercourse instead of emptying her bladder.  Previously prescribed Macrobid for suppression but did not want to take an antibiotic every day.    Note she is dating for her NCLEX        Review of Systems   Genitourinary:  Positive for dysuria and vaginal discharge.       Objective   Physical Exam  Vitals reviewed. Exam conducted with a chaperone present.   Constitutional:       General: She is not in acute distress.     Appearance: Normal appearance. She is not ill-appearing.   Pulmonary:      Effort: Pulmonary effort is normal.   Chest:   Breasts:     Breasts are symmetrical.      Right: Inverted nipple present. No swelling, bleeding, mass, nipple discharge, skin change or tenderness.      Left: Swelling, inverted nipple, mass, skin change and tenderness present. No bleeding or nipple discharge.       Abdominal:      General: There is no distension.      Palpations: Abdomen is soft. There is no mass.      Tenderness: There is no abdominal tenderness. There is no guarding or rebound.       Hernia: There is no hernia in the left inguinal area or right inguinal area.   Genitourinary:     General: Normal vulva.      Exam position: Lithotomy position.      Labia:         Right: No rash, tenderness, lesion or injury.         Left: No rash, tenderness, lesion or injury.       Urethra: No prolapse, urethral swelling or urethral lesion.      Vagina: No vaginal discharge (No malodor noted), erythema, tenderness, bleeding, lesions or prolapsed vaginal walls.      Cervix: No cervical motion tenderness, discharge, friability, lesion, erythema or cervical bleeding.      Uterus: Not deviated, not enlarged, not fixed, not tender and no uterine prolapse.       Adnexa:         Right: No mass, tenderness or fullness.          Left: No mass, tenderness or fullness.     Musculoskeletal:      Right lower leg: No edema.      Left lower leg: No edema.   Lymphadenopathy:      Upper Body:      Right upper body: No supraclavicular, axillary or pectoral adenopathy.      Left upper body: No supraclavicular, axillary or pectoral adenopathy.      Lower Body: No right inguinal adenopathy. No left inguinal adenopathy.   Neurological:      Mental Status: She is alert.         Assessment/Plan   Diagnoses and all orders for this visit:  Encounter for annual routine gynecological examination  -     THINPREP PAP TEST  Dysuria  -     POCT UA (nonautomated) manually resulted  -     sulfamethoxazole-trimethoprim (Bactrim DS) 800-160 mg tablet; Take 1 tablet by mouth 2 times a day for 5 days.  Screen for STD (sexually transmitted disease)  -     HIV 1/2 Antigen/Antibody Screen with Reflex to Confirmation; Future  -     Hepatitis B Surface Antigen; Future  -     Hepatitis C Antibody; Future  -     Syphilis Screen with Reflex; Future  Vaginal discharge  -     Vaginitis Gram Stain For Bacterial Vaginosis + Yeast  History of chronic urinary tract infection  -     Urine Culture  Infection of left breast    Patient here for annual visit.  Pap  obtained clinical breast and pelvic exam within normal limits aside from 2 cm area of palpable mass with overlying skin changes and erythema fluctuance.  This presentation is consistent with infection.  Patient was previously prescribed Keflex.  Recommend the patient start course of Keflex.  Patient follow-up in 6 months for repeat exam to ensure resolution of breast mass.  Serum and swabs for STI screening ordered.  Clinical impression is of no vaginitis but will follow-up Gram stain.  Will also treat empirically with Bactrim for UTI given dysuria and history of chronic UTI infection.  Follow-up urine culture as well.        Kael Andrews MD 08/28/24 11:15 AM

## 2024-08-29 LAB
BACTERIA UR CULT: NORMAL
C TRACH RRNA SPEC QL NAA+PROBE: NEGATIVE
N GONORRHOEA DNA SPEC QL PROBE+SIG AMP: NEGATIVE
T VAGINALIS RRNA SPEC QL NAA+PROBE: NEGATIVE

## 2024-08-30 LAB
CLUE CELLS VAG LPF-#/AREA: NORMAL /[LPF]
CYTOLOGY CMNT CVX/VAG CYTO-IMP: NORMAL
LAB AP HPV HR: NORMAL
LAB AP PAP ADDITIONAL TESTS: NORMAL
LABORATORY COMMENT REPORT: NORMAL
LMP START DATE: NORMAL
NUGENT SCORE: 0
PATH REPORT.TOTAL CANCER: NORMAL
YEAST VAG WET PREP-#/AREA: NORMAL

## 2024-11-06 ENCOUNTER — APPOINTMENT (OUTPATIENT)
Dept: OBSTETRICS AND GYNECOLOGY | Facility: CLINIC | Age: 24
End: 2024-11-06
Payer: COMMERCIAL

## 2024-11-12 ENCOUNTER — APPOINTMENT (OUTPATIENT)
Dept: OBSTETRICS AND GYNECOLOGY | Facility: CLINIC | Age: 24
End: 2024-11-12
Payer: COMMERCIAL

## 2024-11-12 VITALS — BODY MASS INDEX: 24.37 KG/M2 | WEIGHT: 129 LBS | SYSTOLIC BLOOD PRESSURE: 148 MMHG | DIASTOLIC BLOOD PRESSURE: 87 MMHG

## 2024-11-12 DIAGNOSIS — R03.0 ELEVATED BLOOD-PRESSURE READING, WITHOUT DIAGNOSIS OF HYPERTENSION: ICD-10-CM

## 2024-11-12 DIAGNOSIS — Z34.90 PREGNANCY, UNSPECIFIED GESTATIONAL AGE (HHS-HCC): Primary | ICD-10-CM

## 2024-11-12 PROBLEM — N30.00 CYSTITIS, ACUTE: Status: RESOLVED | Noted: 2023-06-12 | Resolved: 2024-11-12

## 2024-11-12 PROCEDURE — 90656 IIV3 VACC NO PRSV 0.5 ML IM: CPT | Performed by: STUDENT IN AN ORGANIZED HEALTH CARE EDUCATION/TRAINING PROGRAM

## 2024-11-12 PROCEDURE — 87491 CHLMYD TRACH DNA AMP PROBE: CPT

## 2024-11-12 PROCEDURE — 90471 IMMUNIZATION ADMIN: CPT | Performed by: STUDENT IN AN ORGANIZED HEALTH CARE EDUCATION/TRAINING PROGRAM

## 2024-11-12 PROCEDURE — 0500F INITIAL PRENATAL CARE VISIT: CPT | Performed by: STUDENT IN AN ORGANIZED HEALTH CARE EDUCATION/TRAINING PROGRAM

## 2024-11-12 PROCEDURE — 87661 TRICHOMONAS VAGINALIS AMPLIF: CPT

## 2024-11-12 PROCEDURE — 87591 N.GONORRHOEAE DNA AMP PROB: CPT

## 2024-11-12 PROCEDURE — 87086 URINE CULTURE/COLONY COUNT: CPT

## 2024-11-12 RX ORDER — NAPROXEN SODIUM 220 MG/1
162 TABLET, FILM COATED ORAL DAILY
Qty: 180 TABLET | Refills: 3 | Status: SHIPPED | OUTPATIENT
Start: 2024-11-12 | End: 2025-11-12

## 2024-11-12 SDOH — ECONOMIC STABILITY: FOOD INSECURITY: WITHIN THE PAST 12 MONTHS, YOU WORRIED THAT YOUR FOOD WOULD RUN OUT BEFORE YOU GOT MONEY TO BUY MORE.: NEVER TRUE

## 2024-11-12 SDOH — ECONOMIC STABILITY: FOOD INSECURITY: WITHIN THE PAST 12 MONTHS, THE FOOD YOU BOUGHT JUST DIDN'T LAST AND YOU DIDN'T HAVE MONEY TO GET MORE.: NEVER TRUE

## 2024-11-12 ASSESSMENT — ENCOUNTER SYMPTOMS
OCCASIONAL FEELINGS OF UNSTEADINESS: 0
DEPRESSION: 0
LOSS OF SENSATION IN FEET: 0

## 2024-11-12 ASSESSMENT — EDINBURGH POSTNATAL DEPRESSION SCALE (EPDS)
I HAVE LOOKED FORWARD WITH ENJOYMENT TO THINGS: AS MUCH AS I EVER DID
I HAVE BEEN ANXIOUS OR WORRIED FOR NO GOOD REASON: NO, NOT AT ALL
I HAVE BEEN SO UNHAPPY THAT I HAVE BEEN CRYING: NO, NEVER
I HAVE BEEN ABLE TO LAUGH AND SEE THE FUNNY SIDE OF THINGS: AS MUCH AS I ALWAYS COULD
THINGS HAVE BEEN GETTING ON TOP OF ME: NO, MOST OF THE TIME I HAVE COPED QUITE WELL
I HAVE BEEN SO UNHAPPY THAT I HAVE HAD DIFFICULTY SLEEPING: NOT AT ALL
I HAVE BLAMED MYSELF UNNECESSARILY WHEN THINGS WENT WRONG: NO, NEVER
I HAVE FELT SAD OR MISERABLE: NO, NOT AT ALL
THE THOUGHT OF HARMING MYSELF HAS OCCURRED TO ME: NEVER
I HAVE FELT SCARED OR PANICKY FOR NO GOOD REASON: NO, NOT AT ALL
TOTAL SCORE: 1

## 2024-11-12 ASSESSMENT — PATIENT HEALTH QUESTIONNAIRE - PHQ9
2. FEELING DOWN, DEPRESSED OR HOPELESS: NOT AT ALL
SUM OF ALL RESPONSES TO PHQ9 QUESTIONS 1 & 2: 0
1. LITTLE INTEREST OR PLEASURE IN DOING THINGS: NOT AT ALL

## 2024-11-12 ASSESSMENT — LIFESTYLE VARIABLES
HOW OFTEN DO YOU HAVE SIX OR MORE DRINKS ON ONE OCCASION: NEVER
HOW MANY STANDARD DRINKS CONTAINING ALCOHOL DO YOU HAVE ON A TYPICAL DAY: PATIENT DOES NOT DRINK
SKIP TO QUESTIONS 9-10: 1
HOW OFTEN DO YOU HAVE A DRINK CONTAINING ALCOHOL: NEVER
AUDIT-C TOTAL SCORE: 0

## 2024-11-12 NOTE — LETTER
11/12/2024    To Whom It May Concern:    This is to certify that my patient,Ifeanyi Lowery is under my care for her pregnancy.    The following guidelines may be used for any dental work:  You may use a local anesthetic with or without Epinephrine.  You may prescribe any Penicillin or Cephalosporin if an antibiotic is necessary. (Dependent on allergy history)  You may take x-rays with a lead apron if necessary.  You may prescribe Tylenol and/or narcotics for pain.  You may extract teeth if necessary.    If you need further information or if you have any concerns, please contact our office.    Sincerely,        Kael Andrews MD  [facility]

## 2024-11-12 NOTE — LETTER
11/12/2024    To Whom It May Concern:    Ifeanyi Lowery is being followed for her pregnancy at [facility].  Estimated Date of Delivery: 6/26/25    Sincerely,        Kael Andrews MD  [facility]

## 2024-11-12 NOTE — PROGRESS NOTES
Ifeanyi Lowery is a 24 y.o.  at 7w5d here for OB follow-up.      Subjective     No acute complaints.  Denies vaginal bleeding, leakage of fluid, painful regular uterine contractions, decreased fetal movement.     Review of Systems   All other systems reviewed and are negative.      OB History    Para Term  AB Living   2       1 0   SAB IAB Ectopic Multiple Live Births   1       0      # Outcome Date GA Lbr German/2nd Weight Sex Type Anes PTL Lv   2 Current            1 SAB              Bannister  Depression Scale Total: 1    Objective   Physical Exam  Weight: 58.5 kg (129 lb)  Expected Total Weight Gain: 11.5 kg (25 lb)-16 kg (35 lb)   Pregravid BMI: 24.39  BP: 148/87  --     --                --  Physical Exam  Constitutional:       General: She is not in acute distress.     Appearance: She is not ill-appearing, toxic-appearing or diaphoretic.   Pulmonary:      Effort: Pulmonary effort is normal.   Neurological:      Mental Status: She is alert.   Psychiatric:         Mood and Affect: Mood normal.   Vitals reviewed.          ASSESSMENT & PLAN    Ifeanyi Lowery is a 24 y.o.  at 7w5d here for the following concerns we addressed today:    Problem List Items Addressed This Visit       Pregnancy ([facility]) - Primary    Overview     Desired provider in labor: [] CNM  [] Physician  [] Blood Products: [] Yes, accepts [] No, needs counseling  [x] Initial BMI: 24.39   [] Prenatal Labs:   [] Cervical Cancer Screening up to date  [] Rh status:   [] Genetic Screening:    [] NT US: (11-13 wks)  [] Baby ASA (if indicated):  [] Pregnancy dated by:     [] Anatomy US: (19-20 wks)  [] Federal Sterilization consent signed (if indicated):  [] 1hr GCT at 24-28wks:  [] Rhogam (if indicated):   [] Fetal Surveillance (if indicated):  [] Tdap (27-32 wks, may be given up to 36 wks if initial window missed):   [] RSV (32-36 wks) (Sept. to end of ):   [x] Flu Vaccine: 24    [] Breastfeeding:  []  Postpartum Birth control method:   [] GBS at 36 - 37 wks:  [] 39 weeks discussion of IOL vs. Expectant management:  [] Mode of delivery ( anticipated ):           Relevant Medications    aspirin 81 mg chewable tablet    Other Relevant Orders    US MAC OB imaging order    Hemoglobin A1C    Type And Screen    CBC Anemia Panel With Reflex,Pregnancy    Hemoglobin Identification with Path Review    Hepatitis C Antibody    Hepatitis B Surface Antigen    Rubella Antibody, IgG    Syphilis Screen with Reflex    HIV 1/2 Antigen/Antibody Screen with Reflex to Confirmation    Urine Culture    Trichomonas vaginalis, Amplified    C. trachomatis / N. gonorrhoeae, Amplified    Elevated blood-pressure reading, without diagnosis of hypertension    Overview     11/12: Mild range blood pressure at 148/87              Patient here for new OB visit.  Basic prenatal counseling provided.    Follow up in 4 weeks      Kael Andrews MD

## 2024-11-12 NOTE — LETTER
November 12, 2024     Ifeanyi Lowery  [address]   [facility] [id number]    Patient: Ifeanyi Lowery   YOB: 2000   Date of Visit: 11/12/2024       To Whomever it may concern,    This patient received the 2024 Flu vaccine at this appointment on this day, 11/12/2024  Lot # [id number]  Expiration Date 06/30/2025  If you have questions, please do not hesitate to call me. I look forward to following your patient along with you.       Sincerely,     Kael Andrews MD

## 2024-11-13 LAB
BACTERIA UR CULT: NORMAL
T VAGINALIS RRNA SPEC QL NAA+PROBE: NEGATIVE

## 2024-11-14 LAB
C TRACH RRNA SPEC QL NAA+PROBE: NEGATIVE
N GONORRHOEA DNA SPEC QL PROBE+SIG AMP: NEGATIVE

## 2024-11-26 ENCOUNTER — APPOINTMENT (OUTPATIENT)
Dept: OBSTETRICS AND GYNECOLOGY | Facility: CLINIC | Age: 24
End: 2024-11-26
Payer: COMMERCIAL

## 2024-12-10 ENCOUNTER — APPOINTMENT (OUTPATIENT)
Dept: OBSTETRICS AND GYNECOLOGY | Facility: CLINIC | Age: 24
End: 2024-12-10
Payer: COMMERCIAL

## 2024-12-10 VITALS — DIASTOLIC BLOOD PRESSURE: 75 MMHG | BODY MASS INDEX: 25.05 KG/M2 | SYSTOLIC BLOOD PRESSURE: 121 MMHG | WEIGHT: 132.6 LBS

## 2024-12-10 DIAGNOSIS — O21.9 NAUSEA AND VOMITING IN PREGNANCY: Primary | ICD-10-CM

## 2024-12-10 DIAGNOSIS — Z3A.11 11 WEEKS GESTATION OF PREGNANCY (HHS-HCC): ICD-10-CM

## 2024-12-10 LAB
POC APPEARANCE, URINE: CLEAR
POC BILIRUBIN, URINE: NEGATIVE
POC BLOOD, URINE: NEGATIVE
POC COLOR, URINE: ABNORMAL
POC GLUCOSE, URINE: NEGATIVE MG/DL
POC KETONES, URINE: NEGATIVE MG/DL
POC LEUKOCYTES, URINE: NEGATIVE
POC NITRITE,URINE: NEGATIVE
POC PH, URINE: 6 PH
POC PROTEIN, URINE: ABNORMAL MG/DL
POC SPECIFIC GRAVITY, URINE: >=1.03
POC UROBILINOGEN, URINE: 0.2 EU/DL

## 2024-12-10 PROCEDURE — 0501F PRENATAL FLOW SHEET: CPT | Performed by: STUDENT IN AN ORGANIZED HEALTH CARE EDUCATION/TRAINING PROGRAM

## 2024-12-10 RX ORDER — ONDANSETRON 4 MG/1
4 TABLET, FILM COATED ORAL EVERY 12 HOURS PRN
Qty: 14 TABLET | Refills: 0 | Status: SHIPPED | OUTPATIENT
Start: 2024-12-10 | End: 2024-12-17

## 2024-12-10 RX ORDER — PYRIDOXINE HCL (VITAMIN B6) 25 MG
25 TABLET ORAL EVERY 8 HOURS
Qty: 90 TABLET | Refills: 1 | Status: SHIPPED | OUTPATIENT
Start: 2024-12-10 | End: 2025-03-10

## 2024-12-10 NOTE — PROGRESS NOTES
Ifeanyi Lowery is a 24 y.o.  at 11w5d GA here for OB visit.      Subjective     No acute complaints.  Denies vaginal bleeding, leakage of fluid, painful regular uterine contractions, decreased fetal movement.     OB History    Para Term  AB Living   2       1 0   SAB IAB Ectopic Multiple Live Births   1       0      # Outcome Date GA Lbr German/2nd Weight Sex Type Anes PTL Lv   2 Current            1 SAB                   Objective   Physical Exam  Weight: 60.1 kg (132 lb 9.6 oz)  Expected Total Weight Gain: 11.5 kg (25 lb)-16 kg (35 lb)   Pregravid BMI: 24.39  BP: 121/75  --     --                --  OBGyn Exam     ASSESSMENT & PLAN    Ifeanyi Lowery is a 24 y.o.  at 11w5d here for the following concerns we addressed today:    Problem List Items Addressed This Visit       Pregnancy ([facility])    Overview     Desired provider in labor: [] CNM  [] Physician  [] Blood Products: [] Yes, accepts [] No, needs counseling  [x] Initial BMI: 24.39   [] Prenatal Labs:   [] Cervical Cancer Screening up to date  [] Rh status:   [] Genetic Screening:    [] NT US: (11-13 wks)  [] Baby ASA (if indicated):  [] Pregnancy dated by:     [] Anatomy US: (19-20 wks)  [] Federal Sterilization consent signed (if indicated):  [] 1hr GCT at 24-28wks:  [] Rhogam (if indicated):   [] Fetal Surveillance (if indicated):  [] Tdap (27-32 wks, may be given up to 36 wks if initial window missed):   [] RSV (32-36 wks) (Sept. to end of ):   [x] Flu Vaccine: 24    [] Breastfeeding:  [] Postpartum Birth control method:   [] GBS at 36 - 37 wks:  [] 39 weeks discussion of IOL vs. Expectant management:  [] Mode of delivery ( anticipated ):           Relevant Orders    POCT UA Automated manually resulted (Completed)     Other Visit Diagnoses       Nausea and vomiting in pregnancy    -  Primary    Relevant Medications    pyridoxine (Vitamin B-6) 25 mg tablet    doxylamine (Unisom) 25 mg tablet    ondansetron (Zofran) 4 mg  tablet              Patient is not taking aspirin.  Is complaining of nausea and vomiting of pregnancy requesting medications.  To obtain labs and ultrasound.    Follow up in 4 weeks      Kael Andrews MD

## 2024-12-19 ENCOUNTER — HOSPITAL ENCOUNTER (OUTPATIENT)
Dept: RADIOLOGY | Facility: CLINIC | Age: 24
Discharge: HOME | End: 2024-12-19
Payer: COMMERCIAL

## 2024-12-19 DIAGNOSIS — Z34.90 PREGNANCY, UNSPECIFIED GESTATIONAL AGE (HHS-HCC): ICD-10-CM

## 2024-12-19 PROCEDURE — 76813 OB US NUCHAL MEAS 1 GEST: CPT

## 2024-12-19 PROCEDURE — 76813 OB US NUCHAL MEAS 1 GEST: CPT | Performed by: OBSTETRICS & GYNECOLOGY

## 2025-01-07 ENCOUNTER — APPOINTMENT (OUTPATIENT)
Dept: OBSTETRICS AND GYNECOLOGY | Facility: CLINIC | Age: 25
End: 2025-01-07
Payer: COMMERCIAL

## 2025-01-07 VITALS — WEIGHT: 137.6 LBS | SYSTOLIC BLOOD PRESSURE: 117 MMHG | BODY MASS INDEX: 26 KG/M2 | DIASTOLIC BLOOD PRESSURE: 78 MMHG

## 2025-01-07 DIAGNOSIS — Z3A.15 15 WEEKS GESTATION OF PREGNANCY (HHS-HCC): Primary | ICD-10-CM

## 2025-01-07 PROCEDURE — 0501F PRENATAL FLOW SHEET: CPT | Performed by: STUDENT IN AN ORGANIZED HEALTH CARE EDUCATION/TRAINING PROGRAM

## 2025-01-07 NOTE — PROGRESS NOTES
Ifeanyi Lowery is a 24 y.o.  at 15w5d GA here for OB visit.      Subjective     No acute complaints.  Denies vaginal bleeding, leakage of fluid, painful regular uterine contractions, decreased fetal movement.     OB History    Para Term  AB Living   2       1 0   SAB IAB Ectopic Multiple Live Births   1       0      # Outcome Date GA Lbr German/2nd Weight Sex Type Anes PTL Lv   2 Current            1 SAB                   Objective   Physical Exam  Weight: 62.4 kg (137 lb 9.6 oz)  Expected Total Weight Gain: 11.5 kg (25 lb)-16 kg (35 lb)   Pregravid BMI: 24.39  BP: 117/78  --     --  Fetal Heart Rate: 140             --  Physical Exam  Constitutional:       General: She is not in acute distress.     Appearance: She is not ill-appearing, toxic-appearing or diaphoretic.   Pulmonary:      Effort: Pulmonary effort is normal.   Neurological:      Mental Status: She is alert.   Psychiatric:         Mood and Affect: Mood normal.   Vitals reviewed.          ASSESSMENT & PLAN    Ifeanyi Lowery is a 24 y.o.  at 15w5d here for the following concerns we addressed today:    Problem List Items Addressed This Visit       15 weeks gestation of pregnancy ([facility]) - Primary    Overview     Desired provider in labor: [] CNM  [] Physician  [] Blood Products: [] Yes, accepts [] No, needs counseling  [x] Initial BMI: 24.39   [] Prenatal Labs:   [] Cervical Cancer Screening up to date  [] Rh status:   [] Genetic Screening:    [] NT US: (11-13 wks)  [] Baby ASA (if indicated):  [] Pregnancy dated by:     [] Anatomy US: (19-20 wks)  [] Federal Sterilization consent signed (if indicated):  [] 1hr GCT at 24-28wks:  [] Rhogam (if indicated):   [] Fetal Surveillance (if indicated):  [] Tdap (27-32 wks, may be given up to 36 wks if initial window missed):   [] RSV (32-36 wks) (Sept. to end of ):   [x] Flu Vaccine: 24    [] Breastfeeding:  [] Postpartum Birth control method:   [] GBS at 36 - 37 wks:  [] 39 weeks  discussion of IOL vs. Expectant management:  [] Mode of delivery ( anticipated ):           Relevant Orders    [facility] Prenatal Screen       Patient to obtain prenatal labs including cfDNA today.    Follow up in 4 weeks      Kael Andrews MD

## 2025-01-08 ENCOUNTER — APPOINTMENT (OUTPATIENT)
Dept: LAB | Facility: LAB | Age: 25
End: 2025-01-08
Payer: COMMERCIAL

## 2025-01-21 LAB
COMMENTS - MP RESULT TYPE: NORMAL
SCAN RESULT: NORMAL

## 2025-02-04 ENCOUNTER — APPOINTMENT (OUTPATIENT)
Dept: LAB | Facility: HOSPITAL | Age: 25
End: 2025-02-04
Payer: COMMERCIAL

## 2025-02-04 ENCOUNTER — APPOINTMENT (OUTPATIENT)
Dept: OBSTETRICS AND GYNECOLOGY | Facility: CLINIC | Age: 25
End: 2025-02-04
Payer: COMMERCIAL

## 2025-02-04 ENCOUNTER — HOSPITAL ENCOUNTER (OUTPATIENT)
Dept: RADIOLOGY | Facility: CLINIC | Age: 25
Discharge: HOME | End: 2025-02-04
Payer: COMMERCIAL

## 2025-02-04 VITALS — DIASTOLIC BLOOD PRESSURE: 81 MMHG | SYSTOLIC BLOOD PRESSURE: 126 MMHG | BODY MASS INDEX: 26.45 KG/M2 | WEIGHT: 140 LBS

## 2025-02-04 DIAGNOSIS — O99.019 ANTEPARTUM ANEMIA (HHS-HCC): Primary | ICD-10-CM

## 2025-02-04 DIAGNOSIS — Z3A.19 19 WEEKS GESTATION OF PREGNANCY (HHS-HCC): Primary | ICD-10-CM

## 2025-02-04 DIAGNOSIS — Z34.90 PREGNANCY, UNSPECIFIED GESTATIONAL AGE (HHS-HCC): ICD-10-CM

## 2025-02-04 LAB
ABO GROUP (TYPE) IN BLOOD: NORMAL
ANTIBODY SCREEN: NORMAL
ERYTHROCYTE [DISTWIDTH] IN BLOOD BY AUTOMATED COUNT: 12.3 % (ref 11.5–14.5)
HCT VFR BLD AUTO: 30.7 % (ref 36–46)
HGB BLD-MCNC: 10.4 G/DL (ref 12–16)
MCH RBC QN AUTO: 32.3 PG (ref 26–34)
MCHC RBC AUTO-ENTMCNC: 33.9 G/DL (ref 32–36)
MCV RBC AUTO: 95 FL (ref 80–100)
NRBC BLD-RTO: 0 /100 WBCS (ref 0–0)
PLATELET # BLD AUTO: 311 X10*3/UL (ref 150–450)
RBC # BLD AUTO: 3.22 X10*6/UL (ref 4–5.2)
REFLEX ADDED, ANEMIA PANEL: NORMAL
RH FACTOR (ANTIGEN D): NORMAL
WBC # BLD AUTO: 7.8 X10*3/UL (ref 4.4–11.3)

## 2025-02-04 PROCEDURE — 86900 BLOOD TYPING SEROLOGIC ABO: CPT

## 2025-02-04 PROCEDURE — 82607 VITAMIN B-12: CPT | Mod: AHULAB | Performed by: STUDENT IN AN ORGANIZED HEALTH CARE EDUCATION/TRAINING PROGRAM

## 2025-02-04 PROCEDURE — 76805 OB US >/= 14 WKS SNGL FETUS: CPT | Performed by: OBSTETRICS & GYNECOLOGY

## 2025-02-04 PROCEDURE — 83021 HEMOGLOBIN CHROMOTOGRAPHY: CPT

## 2025-02-04 PROCEDURE — 82746 ASSAY OF FOLIC ACID SERUM: CPT | Mod: AHULAB | Performed by: STUDENT IN AN ORGANIZED HEALTH CARE EDUCATION/TRAINING PROGRAM

## 2025-02-04 PROCEDURE — 85027 COMPLETE CBC AUTOMATED: CPT | Performed by: STUDENT IN AN ORGANIZED HEALTH CARE EDUCATION/TRAINING PROGRAM

## 2025-02-04 PROCEDURE — 86901 BLOOD TYPING SEROLOGIC RH(D): CPT

## 2025-02-04 PROCEDURE — 0501F PRENATAL FLOW SHEET: CPT | Performed by: STUDENT IN AN ORGANIZED HEALTH CARE EDUCATION/TRAINING PROGRAM

## 2025-02-04 PROCEDURE — 83550 IRON BINDING TEST: CPT | Mod: AHULAB | Performed by: STUDENT IN AN ORGANIZED HEALTH CARE EDUCATION/TRAINING PROGRAM

## 2025-02-04 PROCEDURE — 86850 RBC ANTIBODY SCREEN: CPT

## 2025-02-04 PROCEDURE — 76805 OB US >/= 14 WKS SNGL FETUS: CPT

## 2025-02-04 PROCEDURE — 82728 ASSAY OF FERRITIN: CPT | Mod: AHULAB | Performed by: STUDENT IN AN ORGANIZED HEALTH CARE EDUCATION/TRAINING PROGRAM

## 2025-02-04 RX ORDER — FERROUS SULFATE 325(65) MG
325 TABLET, DELAYED RELEASE (ENTERIC COATED) ORAL
Qty: 90 TABLET | Refills: 3 | Status: SHIPPED | OUTPATIENT
Start: 2025-02-04 | End: 2026-02-04

## 2025-02-04 NOTE — PROGRESS NOTES
Ifeanyi Lowery is a 24 y.o.  at 19w5d GA here for OB visit.      Subjective     No acute complaints.  Denies vaginal bleeding, leakage of fluid, painful regular uterine contractions, decreased fetal movement.     OB History    Para Term  AB Living   2       1 0   SAB IAB Ectopic Multiple Live Births   1       0      # Outcome Date GA Lbr German/2nd Weight Sex Type Anes PTL Lv   2 Current            1 SAB                   Objective   Physical Exam  Weight: 63.5 kg (140 lb)  Expected Total Weight Gain: 11.5 kg (25 lb)-16 kg (35 lb)   Pregravid BMI: 24.39  BP: 126/81  --     --  Fetal Heart Rate: 150             --  OBGyn Exam     ASSESSMENT & PLAN    Ifeanyi Lowery is a 24 y.o.  at 19w5d here for the following concerns we addressed today:    Problem List Items Addressed This Visit    None  Visit Diagnoses       19 weeks gestation of pregnancy ([facility])    -  Primary    Relevant Orders    CBC Anemia Panel With Reflex,Pregnancy    Type And Screen Is this order related to pregnancy or an upcoming surgery? Yes; Where will this surgery/delivery be performed? [facility] [place]; What is the date of the surgery? 2025; Has this patient ever had a transfusion? No; Has this ...    Hemoglobin Identification with Path Review    Syphilis Screen with Reflex    Rubella Antibody, IgG    Hepatitis C Antibody    HIV 1/2 Antigen/Antibody Screen with Reflex to Confirmation    Hepatitis B Surface Antigen    TSH with reflex to Free T4 if abnormal    Hemoglobin A1C              Will review prenatal abscess.  A strong last lab draw.  Otherwise progressing well scan was done today.    Follow up in 4 weeks      Kael Andrews MD

## 2025-02-05 LAB
FERRITIN SERPL-MCNC: 29 NG/ML
FOLATE SERPL-MCNC: 17.2 NG/ML
HEMOGLOBIN A2: 3.2 % (ref 2–3.5)
HEMOGLOBIN A: 95.9 % (ref 95.8–98)
HEMOGLOBIN F: 0.9 % (ref 0–2)
HEMOGLOBIN IDENTIFICATION INTERPRETATION: NORMAL
IRON SATN MFR SERPL: 23 %
IRON SERPL-MCNC: 87 UG/DL
PATH REVIEW-HGB IDENTIFICATION: NORMAL
TIBC SERPL-MCNC: 372 UG/DL
UIBC SERPL-MCNC: 285 UG/DL
VIT B12 SERPL-MCNC: 794 PG/ML

## 2025-02-06 LAB
EST. AVERAGE GLUCOSE BLD GHB EST-MCNC: 97 MG/DL
EST. AVERAGE GLUCOSE BLD GHB EST-SCNC: 5.4 MMOL/L
HBA1C MFR BLD: 5 % OF TOTAL HGB
HBV SURFACE AG SERPL QL IA: NORMAL
HCV AB SERPL QL IA: NORMAL
HIV 1+2 AB+HIV1 P24 AG SERPL QL IA: NORMAL
RUBV IGG SERPL IA-ACNC: 6.88 INDEX
T PALLIDUM AB SER QL IA: NEGATIVE
TSH SERPL-ACNC: 2.61 MIU/L

## 2025-03-04 ENCOUNTER — APPOINTMENT (OUTPATIENT)
Dept: OBSTETRICS AND GYNECOLOGY | Facility: CLINIC | Age: 25
End: 2025-03-04
Payer: COMMERCIAL

## 2025-03-06 ENCOUNTER — APPOINTMENT (OUTPATIENT)
Dept: OBSTETRICS AND GYNECOLOGY | Facility: CLINIC | Age: 25
End: 2025-03-06
Payer: COMMERCIAL

## 2025-03-06 VITALS — BODY MASS INDEX: 27.59 KG/M2 | WEIGHT: 146 LBS | DIASTOLIC BLOOD PRESSURE: 79 MMHG | SYSTOLIC BLOOD PRESSURE: 133 MMHG

## 2025-03-06 DIAGNOSIS — Z34.90 PREGNANCY, UNSPECIFIED GESTATIONAL AGE (HHS-HCC): ICD-10-CM

## 2025-03-06 DIAGNOSIS — Z3A.24 24 WEEKS GESTATION OF PREGNANCY (HHS-HCC): Primary | ICD-10-CM

## 2025-03-06 DIAGNOSIS — O99.019 ANTEPARTUM ANEMIA (HHS-HCC): ICD-10-CM

## 2025-03-06 DIAGNOSIS — R03.0 ELEVATED BLOOD-PRESSURE READING, WITHOUT DIAGNOSIS OF HYPERTENSION: ICD-10-CM

## 2025-03-06 PROCEDURE — 0501F PRENATAL FLOW SHEET: CPT | Performed by: STUDENT IN AN ORGANIZED HEALTH CARE EDUCATION/TRAINING PROGRAM

## 2025-03-06 RX ORDER — FERROUS SULFATE 325(65) MG
325 TABLET, DELAYED RELEASE (ENTERIC COATED) ORAL
Qty: 90 TABLET | Refills: 3 | Status: SHIPPED | OUTPATIENT
Start: 2025-03-06 | End: 2026-03-06

## 2025-03-06 RX ORDER — NAPROXEN SODIUM 220 MG/1
162 TABLET, FILM COATED ORAL DAILY
Qty: 180 TABLET | Refills: 3 | Status: SHIPPED | OUTPATIENT
Start: 2025-03-06 | End: 2026-03-06

## 2025-03-06 NOTE — PROGRESS NOTES
Ifeanyi Lowery is a 24 y.o.  at 24w0d GA here for OB visit.      Subjective     No acute complaints.  Denies vaginal bleeding, leakage of fluid, painful regular uterine contractions, decreased fetal movement.     OB History    Para Term  AB Living   2       1 0   SAB IAB Ectopic Multiple Live Births   1       0      # Outcome Date GA Lbr German/2nd Weight Sex Type Anes PTL Lv   2 Current            1 SAB                   Objective   Physical Exam  Weight: 66.2 kg (146 lb)  Expected Total Weight Gain: 11.5 kg (25 lb)-16 kg (35 lb)   Pregravid BMI: 24.39  BP: 133/79  --     --  Fetal Heart Rate: 150 Fundal Height (cm): 23 cm           --  Physical Exam  Constitutional:       General: She is not in acute distress.     Appearance: She is not ill-appearing, toxic-appearing or diaphoretic.   Pulmonary:      Effort: Pulmonary effort is normal.   Neurological:      Mental Status: She is alert.   Psychiatric:         Mood and Affect: Mood normal.   Vitals reviewed.          ASSESSMENT & PLAN    Ifeanyi Lowery is a 24 y.o.  at 24w0d here for the following concerns we addressed today:    Problem List Items Addressed This Visit       Elevated blood-pressure reading, without diagnosis of hypertension    Overview     : Mild range blood pressure at 148/87         24 weeks gestation of pregnancy ([facility]) - Primary    Overview     Desired provider in labor: [] CNM  [] Physician  [x] Blood Products: [x] Yes, accepts [] No, needs counseling  [x] Initial BMI: 24.39   [x] Prenatal Labs:   [] Cervical Cancer Screening up to date  [x] Rh status: POS  [] Genetic Screening:    [x] NT US: (11-13 wks) WNL  [x] Baby ASA (if indicated):  [] Pregnancy dated by:     [] Anatomy US: (19-20 wks)  [] Federal Sterilization consent signed (if indicated):  [] 1hr GCT at 24-28wks:  [] Rhogam (if indicated):   [] Fetal Surveillance (if indicated):  [] Tdap (27-32 wks, may be given up to 36 wks if initial window missed):   []  RSV (32-36 wks) (Sept. to end of Jan):   [x] Flu Vaccine: 11/12/24    [] Breastfeeding:  [] Postpartum Birth control method:   [] GBS at 36 - 37 wks:  [] 39 weeks discussion of IOL vs. Expectant management:  [] Mode of delivery ( anticipated ):            Other Visit Diagnoses       Antepartum anemia ([facility])        Pregnancy, unspecified gestational age ([facility])                  Counseled regarding ASA ppx and PO Fe. Disussede Pit in labor and tub labor/pain control    Follow up in 4 weeks    At least 30 minutes was spent on the day of encounter in the professional and overall care of the patient       Kael Andrews MD

## 2025-03-07 LAB — GLUCOSE 1H P 50 G GLC PO SERPL-MCNC: 76 MG/DL

## 2025-04-03 ENCOUNTER — TELEPHONE (OUTPATIENT)
Dept: OBSTETRICS AND GYNECOLOGY | Facility: CLINIC | Age: 25
End: 2025-04-03

## 2025-04-03 ENCOUNTER — APPOINTMENT (OUTPATIENT)
Dept: OBSTETRICS AND GYNECOLOGY | Facility: CLINIC | Age: 25
End: 2025-04-03
Payer: COMMERCIAL

## 2025-04-03 NOTE — TELEPHONE ENCOUNTER
LEFT A MESSAGE FOR PATIENT TOO CALL THE OFFICE, TO RESCHEDULE HER APPOINTMENT, DUE TO THE PROVIDER EMERGENCY CANCELLATION.      THANK YOU

## 2025-04-04 ENCOUNTER — ROUTINE PRENATAL (OUTPATIENT)
Dept: OBSTETRICS AND GYNECOLOGY | Facility: CLINIC | Age: 25
End: 2025-04-04
Payer: COMMERCIAL

## 2025-04-04 VITALS — DIASTOLIC BLOOD PRESSURE: 74 MMHG | WEIGHT: 149 LBS | BODY MASS INDEX: 28.15 KG/M2 | SYSTOLIC BLOOD PRESSURE: 116 MMHG

## 2025-04-04 DIAGNOSIS — Z71.85 IMMUNIZATION COUNSELING: Primary | ICD-10-CM

## 2025-04-04 DIAGNOSIS — Z3A.28 28 WEEKS GESTATION OF PREGNANCY (HHS-HCC): ICD-10-CM

## 2025-04-04 PROCEDURE — 90715 TDAP VACCINE 7 YRS/> IM: CPT | Performed by: STUDENT IN AN ORGANIZED HEALTH CARE EDUCATION/TRAINING PROGRAM

## 2025-04-04 PROCEDURE — 0501F PRENATAL FLOW SHEET: CPT | Performed by: STUDENT IN AN ORGANIZED HEALTH CARE EDUCATION/TRAINING PROGRAM

## 2025-04-04 PROCEDURE — 90471 IMMUNIZATION ADMIN: CPT | Performed by: STUDENT IN AN ORGANIZED HEALTH CARE EDUCATION/TRAINING PROGRAM

## 2025-04-04 ASSESSMENT — EDINBURGH POSTNATAL DEPRESSION SCALE (EPDS)
I HAVE BEEN ABLE TO LAUGH AND SEE THE FUNNY SIDE OF THINGS: AS MUCH AS I ALWAYS COULD
THE THOUGHT OF HARMING MYSELF HAS OCCURRED TO ME: NEVER
TOTAL SCORE: 0
THINGS HAVE BEEN GETTING ON TOP OF ME: NO, I HAVE BEEN COPING AS WELL AS EVER
I HAVE FELT SCARED OR PANICKY FOR NO GOOD REASON: NO, NOT AT ALL
I HAVE BEEN ANXIOUS OR WORRIED FOR NO GOOD REASON: NO, NOT AT ALL
I HAVE BLAMED MYSELF UNNECESSARILY WHEN THINGS WENT WRONG: NO, NEVER
I HAVE BEEN SO UNHAPPY THAT I HAVE HAD DIFFICULTY SLEEPING: NOT AT ALL
I HAVE BEEN SO UNHAPPY THAT I HAVE BEEN CRYING: NO, NEVER
I HAVE FELT SAD OR MISERABLE: NO, NOT AT ALL
I HAVE LOOKED FORWARD WITH ENJOYMENT TO THINGS: AS MUCH AS I EVER DID

## 2025-04-04 ASSESSMENT — PATIENT HEALTH QUESTIONNAIRE - PHQ9
1. LITTLE INTEREST OR PLEASURE IN DOING THINGS: NOT AT ALL
2. FEELING DOWN, DEPRESSED OR HOPELESS: NOT AT ALL
SUM OF ALL RESPONSES TO PHQ9 QUESTIONS 1 AND 2: 0

## 2025-04-04 NOTE — PROGRESS NOTES
Ifeanyi Lowery is a 24 y.o.  at 28w1d GA here for OB visit.      Subjective     No acute complaints.  Denies vaginal bleeding, leakage of fluid, painful regular uterine contractions, decreased fetal movement.     OB History    Para Term  AB Living   2       1 0   SAB IAB Ectopic Multiple Live Births   1       0      # Outcome Date GA Lbr German/2nd Weight Sex Type Anes PTL Lv   2 Current            1 SAB              Laie  Depression Scale Total: 0    Objective   Physical Exam  Weight: 67.6 kg (149 lb)  Expected Total Weight Gain: 11.5 kg (25 lb)-16 kg (35 lb)   Pregravid BMI: 24.39  BP: 116/74  --     --  Fetal Heart Rate: 145 Fundal Height (cm): 28 cm           --  Physical Exam  Constitutional:       General: She is not in acute distress.     Appearance: She is not ill-appearing, toxic-appearing or diaphoretic.   Pulmonary:      Effort: Pulmonary effort is normal.   Neurological:      Mental Status: She is alert.   Psychiatric:         Mood and Affect: Mood normal.   Vitals reviewed.          ASSESSMENT & PLAN    Ifeanyi Lowery is a 24 y.o.  at 28w1d here for the following concerns we addressed today:    Problem List Items Addressed This Visit       28 weeks gestation of pregnancy ([facility])    Overview     Desired provider in labor: [] CNM  [] Physician  [x] Blood Products: [x] Yes, accepts [] No, needs counseling  [x] Initial BMI: 24.39   [x] Prenatal Labs:   [] Cervical Cancer Screening up to date  [x] Rh status: POS  [] Genetic Screening:    [x] NT US: (11-13 wks) WNL  [x] Baby ASA (if indicated):  [] Pregnancy dated by:     [x] Anatomy US: (19-20 wks) WNL  [] Federal Sterilization consent signed (if indicated):  [] 1hr GCT at 24-28wks:  [] Rhogam (if indicated):   [] Fetal Surveillance (if indicated):  [] Tdap (27-32 wks, may be given up to 36 wks if initial window missed):   [] RSV (32-36 wks) (Sept. to end of ):   [x] Flu Vaccine: 24    [] Breastfeeding:  []  Postpartum Birth control method:   [] GBS at 36 - 37 wks:  [] 39 weeks discussion of IOL vs. Expectant management:  [] Mode of delivery ( anticipated ):            Other Visit Diagnoses       Immunization counseling    -  Primary              LIYA.  Tdap today with counseling on rationale and encouragement of cocooning.    Follow up in 4 weeks      Kael Andrews MD

## 2025-05-02 ENCOUNTER — LAB (OUTPATIENT)
Dept: LAB | Facility: HOSPITAL | Age: 25
End: 2025-05-02
Payer: COMMERCIAL

## 2025-05-02 ENCOUNTER — APPOINTMENT (OUTPATIENT)
Dept: OBSTETRICS AND GYNECOLOGY | Facility: CLINIC | Age: 25
End: 2025-05-02
Payer: COMMERCIAL

## 2025-05-02 VITALS — BODY MASS INDEX: 27.78 KG/M2 | SYSTOLIC BLOOD PRESSURE: 120 MMHG | DIASTOLIC BLOOD PRESSURE: 77 MMHG | WEIGHT: 147 LBS

## 2025-05-02 DIAGNOSIS — O99.019 ANEMIA COMPLICATING PREGNANCY, UNSPECIFIED TRIMESTER: Primary | ICD-10-CM

## 2025-05-02 DIAGNOSIS — Z3A.32 32 WEEKS GESTATION OF PREGNANCY (HHS-HCC): Primary | ICD-10-CM

## 2025-05-02 DIAGNOSIS — O99.019 ANTEPARTUM ANEMIA: ICD-10-CM

## 2025-05-02 LAB
ERYTHROCYTE [DISTWIDTH] IN BLOOD BY AUTOMATED COUNT: 13 % (ref 11.5–14.5)
FERRITIN SERPL-MCNC: 16 NG/ML
HCT VFR BLD AUTO: 27.7 % (ref 36–46)
HGB BLD-MCNC: 9.3 G/DL (ref 12–16)
IRON SATN MFR SERPL: 33 %
IRON SERPL-MCNC: 156 UG/DL
MCH RBC QN AUTO: 31.4 PG (ref 26–34)
MCHC RBC AUTO-ENTMCNC: 33.6 G/DL (ref 32–36)
MCV RBC AUTO: 94 FL (ref 80–100)
NRBC BLD-RTO: 0 /100 WBCS (ref 0–0)
PLATELET # BLD AUTO: 290 X10*3/UL (ref 150–450)
RBC # BLD AUTO: 2.96 X10*6/UL (ref 4–5.2)
REFLEX ADDED, ANEMIA PANEL: NORMAL
TIBC SERPL-MCNC: 479 UG/DL
UIBC SERPL-MCNC: 323 UG/DL
WBC # BLD AUTO: 8 X10*3/UL (ref 4.4–11.3)

## 2025-05-02 PROCEDURE — 82746 ASSAY OF FOLIC ACID SERUM: CPT

## 2025-05-02 PROCEDURE — 82728 ASSAY OF FERRITIN: CPT

## 2025-05-02 PROCEDURE — 0501F PRENATAL FLOW SHEET: CPT | Performed by: STUDENT IN AN ORGANIZED HEALTH CARE EDUCATION/TRAINING PROGRAM

## 2025-05-02 PROCEDURE — 36415 COLL VENOUS BLD VENIPUNCTURE: CPT

## 2025-05-02 PROCEDURE — 83550 IRON BINDING TEST: CPT

## 2025-05-02 PROCEDURE — 85027 COMPLETE CBC AUTOMATED: CPT

## 2025-05-02 PROCEDURE — 82607 VITAMIN B-12: CPT

## 2025-05-02 NOTE — PROGRESS NOTES
Ifeanyi Lowery is a 24 y.o.  at 32w1d GA here for OB visit.      Subjective     No acute complaints.  Denies vaginal bleeding, leakage of fluid, painful regular uterine contractions, decreased fetal movement.     OB History    Para Term  AB Living   2    1 0   SAB IAB Ectopic Multiple Live Births   1    0      # Outcome Date GA Lbr German/2nd Weight Sex Type Anes PTL Lv   2 Current            1 SAB                Objective   Physical Exam  Weight: 66.7 kg (147 lb)  Expected Total Weight Gain: 11.5 kg (25 lb)-16 kg (35 lb)   Pregravid BMI: 24.39  BP: 120/77     --  *   --  Fetal Heart Rate: 145 Fundal Height (cm): 30 cm           --  Physical Exam  Constitutional:       General: She is not in acute distress.     Appearance: She is not ill-appearing, toxic-appearing or diaphoretic.   Pulmonary:      Effort: Pulmonary effort is normal.   Neurological:      Mental Status: She is alert.   Psychiatric:         Mood and Affect: Mood normal.   Vitals reviewed.          ASSESSMENT & PLAN    Ifeanyi Lowery is a 24 y.o.  at 32w1d here for the following concerns we addressed today:    Problem List Items Addressed This Visit       32 weeks gestation of pregnancy ([facility]) - Primary    Overview   Desired provider in labor: [] CNM  [] Physician  [x] Blood Products: [x] Yes, accepts [] No, needs counseling  [x] Initial BMI: 24.39   [x] Prenatal Labs:   [] Cervical Cancer Screening up to date  [x] Rh status: POS  [] Genetic Screening:    [x] NT US: (11-13 wks) WNL  [x] Baby ASA (if indicated):  [] Pregnancy dated by:     [x] Anatomy US: (19-20 wks) WNL  [] Federal Sterilization consent signed (if indicated):  [x] 1hr GCT at 24-28wks: WNL  [x] Rhogam (if indicated): N/A  [] Fetal Surveillance (if indicated):  [x] Tdap (27-32 wks, may be given up to 36 wks if initial window missed):   [x] RSV (32-36 wks) (Sept. to end of ): NA  [x] Flu Vaccine: 24    [] Breastfeeding:  [] Postpartum Birth control  method:   [] GBS at 36 - 37 wks:  [] 39 weeks discussion of IOL vs. Expectant management:  [] Mode of delivery ( anticipated ):           Antepartum anemia    Relevant Orders    CBC Anemia Panel With Reflex,Pregnancy         LIYA. Taking POI Fe. Will check CBC.    Follow up in 2 weeks      Kael Andrews MD

## 2025-05-03 LAB
FOLATE SERPL-MCNC: >24 NG/ML
VIT B12 SERPL-MCNC: 599 PG/ML

## 2025-05-16 ENCOUNTER — APPOINTMENT (OUTPATIENT)
Dept: OBSTETRICS AND GYNECOLOGY | Facility: CLINIC | Age: 25
End: 2025-05-16
Payer: COMMERCIAL

## 2025-05-16 VITALS — WEIGHT: 148 LBS | DIASTOLIC BLOOD PRESSURE: 74 MMHG | SYSTOLIC BLOOD PRESSURE: 110 MMHG | BODY MASS INDEX: 27.96 KG/M2

## 2025-05-16 DIAGNOSIS — Z3A.34 34 WEEKS GESTATION OF PREGNANCY (HHS-HCC): Primary | ICD-10-CM

## 2025-05-16 PROCEDURE — 0501F PRENATAL FLOW SHEET: CPT | Performed by: STUDENT IN AN ORGANIZED HEALTH CARE EDUCATION/TRAINING PROGRAM

## 2025-05-16 NOTE — PROGRESS NOTES
Ifeanyi Lowery is a 24 y.o.  at 34w1d GA here for OB visit.      Subjective     No acute complaints.  Denies vaginal bleeding, leakage of fluid, painful regular uterine contractions, decreased fetal movement.     OB History    Para Term  AB Living   2    1 0   SAB IAB Ectopic Multiple Live Births   1    0      # Outcome Date GA Lbr German/2nd Weight Sex Type Anes PTL Lv   2 Current            1 SAB                Objective   Physical Exam  Weight: 67.1 kg (148 lb)  Expected Total Weight Gain: 11.5 kg (25 lb)-16 kg (35 lb)   Pregravid BMI: 24.39  BP: 110/74     --     --  Fetal Heart Rate: 145 Fundal Height (cm): 31 cm           --  Physical Exam  Constitutional:       General: She is not in acute distress.     Appearance: She is not ill-appearing, toxic-appearing or diaphoretic.   Pulmonary:      Effort: Pulmonary effort is normal.   Neurological:      Mental Status: She is alert.   Psychiatric:         Mood and Affect: Mood normal.   Vitals reviewed.          ASSESSMENT & PLAN    Ifeanyi Lowery is a 24 y.o.  at 34w1d here for the following concerns we addressed today:    Problem List Items Addressed This Visit       34 weeks gestation of pregnancy ([facility]) - Primary    Overview   Desired provider in labor: [] CNM  [] Physician  [x] Blood Products: [x] Yes, accepts [] No, needs counseling  [x] Initial BMI: 24.39   [x] Prenatal Labs:   [] Cervical Cancer Screening up to date  [x] Rh status: POS  [x] Genetic Screening:  low risk, XY  [x] NT US: (11-13 wks) WNL  [x] Baby ASA (if indicated):  [] Pregnancy dated by:     [x] Anatomy US: (19-20 wks) WNL  [] Federal Sterilization consent signed (if indicated):  [x] 1hr GCT at 24-28wks: WNL  [x] Rhogam (if indicated): N/A  [] Fetal Surveillance (if indicated):  [x] Tdap (27-32 wks, may be given up to 36 wks if initial window missed):   [x] RSV (32-36 wks) (Sept. to end of ): NA  [x] Flu Vaccine: 24    [] Breastfeeding:  [] Postpartum Birth  control method:   [] GBS at 36 - 37 wks:  [] 39 weeks discussion of IOL vs. Expectant management:  [] Mode of delivery ( anticipated ):                LIYA.     Follow up in 2 weeks      Kael Andrews MD

## 2025-05-19 ENCOUNTER — HOSPITAL ENCOUNTER (EMERGENCY)
Facility: HOSPITAL | Age: 25
Discharge: OTHER INSTITUTION | End: 2025-05-19
Payer: COMMERCIAL

## 2025-05-19 ENCOUNTER — HOSPITAL ENCOUNTER (OUTPATIENT)
Facility: HOSPITAL | Age: 25
End: 2025-05-19
Attending: OBSTETRICS & GYNECOLOGY | Admitting: OBSTETRICS & GYNECOLOGY
Payer: COMMERCIAL

## 2025-05-19 ENCOUNTER — HOSPITAL ENCOUNTER (OUTPATIENT)
Facility: HOSPITAL | Age: 25
Discharge: HOME | End: 2025-05-19
Attending: OBSTETRICS & GYNECOLOGY | Admitting: OBSTETRICS & GYNECOLOGY
Payer: COMMERCIAL

## 2025-05-19 VITALS
OXYGEN SATURATION: 98 % | RESPIRATION RATE: 16 BRPM | HEIGHT: 61 IN | DIASTOLIC BLOOD PRESSURE: 76 MMHG | BODY MASS INDEX: 28.68 KG/M2 | SYSTOLIC BLOOD PRESSURE: 118 MMHG | TEMPERATURE: 97 F | WEIGHT: 151.9 LBS | HEART RATE: 99 BPM

## 2025-05-19 LAB
APPEARANCE UR: CLEAR
BILIRUB UR STRIP.AUTO-MCNC: NEGATIVE MG/DL
COLOR UR: COLORLESS
GLUCOSE UR STRIP.AUTO-MCNC: NORMAL MG/DL
KETONES UR STRIP.AUTO-MCNC: NEGATIVE MG/DL
LEUKOCYTE ESTERASE UR QL STRIP.AUTO: NEGATIVE
NITRITE UR QL STRIP.AUTO: NEGATIVE
PH UR STRIP.AUTO: 7 [PH]
PROT UR STRIP.AUTO-MCNC: NEGATIVE MG/DL
RBC # UR STRIP.AUTO: NEGATIVE MG/DL
SP GR UR STRIP.AUTO: 1
UROBILINOGEN UR STRIP.AUTO-MCNC: NORMAL MG/DL

## 2025-05-19 PROCEDURE — 81003 URINALYSIS AUTO W/O SCOPE: CPT | Performed by: MIDWIFE

## 2025-05-19 PROCEDURE — 99214 OFFICE O/P EST MOD 30 MIN: CPT

## 2025-05-19 PROCEDURE — 99213 OFFICE O/P EST LOW 20 MIN: CPT | Performed by: MIDWIFE

## 2025-05-19 PROCEDURE — 59025 FETAL NON-STRESS TEST: CPT | Performed by: MIDWIFE

## 2025-05-19 PROCEDURE — 4500999001 HC ED NO CHARGE

## 2025-05-19 ASSESSMENT — PAIN SCALES - GENERAL
PAINLEVEL_OUTOF10: 0 - NO PAIN
PAINLEVEL_OUTOF10: 2

## 2025-05-19 NOTE — H&P
OB Triage H&P    Assessment/Plan    Ifeanyi Lowery is a 24 y.o.  at 34w4d, THONG: 2025, by Last Menstrual Period, who presents to triage with concern for possible ROM.    Plan    -Fetal monitoring reassuring  -Good fetal movement  -Up to date on prenatal care  -Continue routine prenatal care    Dispo  -anticipate discharge to home eventually     Discussed plan and reviewed with: pt and her family    EMANUEL Mcdonald RITCHIE     Pregnancy Problems (from 24 to present)       Problem Noted Diagnosed Resolved    Antepartum anemia 3/6/2025 by Kael Andrews MD  No    Priority:  Medium       Nausea and vomiting in pregnancy 12/10/2024 by Kael Andrews MD  No    Priority:  Medium       34 weeks gestation of pregnancy ([facility]) 2024 by Kael Andrews MD  No    Priority:  Medium       Overview Addendum 2025 10:41 AM by Kael Andrews MD   Desired provider in labor: [] CNM  [] Physician  [x] Blood Products: [x] Yes, accepts [] No, needs counseling  [x] Initial BMI: 24.39   [x] Prenatal Labs:   [] Cervical Cancer Screening up to date  [x] Rh status: POS  [x] Genetic Screening:  low risk, XY  [x] NT US: (11-13 wks) WNL  [x] Baby ASA (if indicated):  [] Pregnancy dated by:     [x] Anatomy US: (19-20 wks) WNL  [] Federal Sterilization consent signed (if indicated):  [x] 1hr GCT at 24-28wks: WNL  [x] Rhogam (if indicated): N/A  [] Fetal Surveillance (if indicated):  [x] Tdap (27-32 wks, may be given up to 36 wks if initial window missed):   [x] RSV (32-36 wks) (Sept. to end ): NA  [x] Flu Vaccine: 24    [] Breastfeeding:  [] Postpartum Birth control method:   [] GBS at 36 - 37 wks:  [] 39 weeks discussion of IOL vs. Expectant management:  [] Mode of delivery ( anticipated ):                   Subjective   PT arrives with family to [facility] L&D triage c/o vaginal LOF, increased pelvic pressure, and decreased fetal movement. She was at work and felt a gush of vaginal fluid and  feels like after this fetal movement slowed down from normal. She denies HA, blurred vision, GI complaints, urinary symptoms, vaginal bleeding/odor/itching or STD exposure concerns. Last intercourse yesterday.     Prenatal Provider Dr. Andrews    OB History    Para Term  AB Living   2 0 0 0 1 0   SAB IAB Ectopic Multiple Live Births   1 0 0 0 0      # Outcome Date GA Lbr German/2nd Weight Sex Type Anes PTL Lv   2 Current            1 SAB                Surgical History[1]    Social History     Tobacco Use    Smoking status: Never     Passive exposure: Never    Smokeless tobacco: Never   Substance Use Topics    Alcohol use: Not Currently       Allergies[2]    Prescriptions Prior to Admission[3]  Objective     Last Vitals  Temp Pulse Resp BP MAP O2 Sat   36.1 °C (97 °F) 99 16 118/76 92 98 %     Blood Pressures         2025  0940 2025  0942 2025  0948       BP: 118/76 118/76 118/76              Physical Exam  General: NAD, mood appropriate  Cardiopulmonary: warm and well perfused, breathing comfortably on room air  Abdomen: Gravid, non-tender  Extremities: Symmetric  Speculum Exam: no pooling of fluid seen, Nitrazine test is negative, Ferning test is negative, vaginal discharge thin white collected in vault, deferred  Cervix:  closed    Chaperone Present: Yes.  Chaperone Name/Title: Jennifer KILGORE   Examination Chaperoned: Entire Physical Exam     Fetal Monitoring  Baseline: 140 bpm, Variability: moderate,  Accelerations: present and Decelerations: none  Uterine Activity: Irregular contractions  Interpretation: Reactive    Bedside ultrasound: No    Labs in chart were reviewed.          Prenatal labs reviewed, not remarkable.             [1]   Past Surgical History:  Procedure Laterality Date    NO PAST SURGERIES     [2] No Known Allergies  [3]   Medications Prior to Admission   Medication Sig Dispense Refill Last Dose/Taking    aspirin 81 mg chewable tablet Chew 2 tablets (162 mg) once daily. 180  tablet 3     doxylamine (Unisom) 25 mg tablet Take 1 tablet (25 mg) by mouth once daily at bedtime. (Patient not taking: Reported on 5/16/2025) 60 tablet 0     ferrous sulfate 325 (65 Fe) mg EC tablet Take 1 tablet by mouth once daily with breakfast. Do not crush, chew, or split. 90 tablet 3     prenatal no115/iron/folic acid (PRENATAL 19 ORAL) Take by mouth.

## 2025-05-19 NOTE — LETTER
May 19, 2025     Patient: Ifeanyi Lowery   YOB: 2000   Date of Visit: 5/19/2025       To Whom It May Concern:    Ifeanyi Lowery was seen in my clinic on 5/19/2025 at . Please excuse Ifeanyi for her absence from work on this day to make the appointment.    If you have any questions or concerns, please don't hesitate to call.         Sincerely,     Mimi KILGORE

## 2025-05-19 NOTE — DISCHARGE SUMMARY
Discharge Summary    Admission Date: 5/19/2025  Discharge Date: 5/19/2025    Discharge Diagnosis  -contractions in multigravida at 34wga   -Reactive NST     Hospital Course  CNM returned to room to review negative UA and contraction pattern. She endorses that fetal movement has picked up since arrival. PT notes contractions have changed in location with some bilateral lower abdominal radiation, but she would prefer to be discharged if possible. She was agreeable to SVE (closed/thick), following this we discussed the potential for PTL and plan to recheck in 2H vs discharge to home with plan for resting and fluids today. Ultimately she declined staying for recheck and notes she lives close enough that she would rather go home and rest and will either return to triage for concerns or call Dr. Andrews' office for follow up sooner than next scheduled appointment. She is aware that she may return to triage at anytime to be reevaluated. Pt discharged in stable condition.     Dr. Mayfield aware of condition & pt's desire for discharge      Procedures: none    Pertinent Physical Exam At Time of Discharge  GENERAL: Examination reveals a well developed, well nourished, gravid female in no acute distress. She is alert and cooperative.  FHR is 140 , with moderate variability and  Accelerations, no decels or sinusoidal patterns. Reactive NST.    [place] reading:  Irregular contractions every 4 - 6 minutes, duration 30 - 60 seconds, mild contractile intensity and soft uterine resting tone.   CERVIX: 0  cm dilated, 0  % effaced, -3  station; MEMBRANES are Intact  PSYCHOLOGICAL: awake and alert; oriented to person, place, and time      Last Vitals:  Temp Pulse Resp BP MAP Pulse Ox   36.1 °C (97 °F) 99 16 118/76 92 98 %     Discharge Meds     Your medication list        CONTINUE taking these medications        Instructions Last Dose Given Next Dose Due   aspirin 81 mg chewable tablet      Chew 2 tablets (162 mg) once daily.        doxylamine 25 mg tablet  Commonly known as: Unisom      Take 1 tablet (25 mg) by mouth once daily at bedtime.       ferrous sulfate 325 (65 Fe) mg EC tablet      Take 1 tablet by mouth once daily with breakfast. Do not crush, chew, or split.       PRENATAL 19 ORAL                     Complications Requiring Follow-Up  Contractions need follow up     Test Results Pending At Discharge  Pending Labs       Order Current Status    Extra Urine Gallardo Tube Collected (05/19/25 1023)    Urinalysis with Reflex Culture and Microscopic In process            Outpatient Follow-Up  Future Appointments   Date Time Provider Department Center   5/30/2025  9:45 AM Kael Andrews MD [id number] [facility] spent 30 minutes in the professional and overall care of this patient.      Edith Mcdonald, ANDREA-JULIANEM

## 2025-05-20 ENCOUNTER — TELEPHONE (OUTPATIENT)
Dept: OBSTETRICS AND GYNECOLOGY | Facility: CLINIC | Age: 25
End: 2025-05-20

## 2025-05-20 ENCOUNTER — ROUTINE PRENATAL (OUTPATIENT)
Dept: OBSTETRICS AND GYNECOLOGY | Facility: CLINIC | Age: 25
End: 2025-05-20
Payer: COMMERCIAL

## 2025-05-20 VITALS — WEIGHT: 152.8 LBS | BODY MASS INDEX: 28.87 KG/M2 | SYSTOLIC BLOOD PRESSURE: 116 MMHG | DIASTOLIC BLOOD PRESSURE: 76 MMHG

## 2025-05-20 DIAGNOSIS — O47.03 PRETERM UTERINE CONTRACTIONS IN THIRD TRIMESTER, ANTEPARTUM (HHS-HCC): ICD-10-CM

## 2025-05-20 DIAGNOSIS — Z3A.34 34 WEEKS GESTATION OF PREGNANCY (HHS-HCC): Primary | ICD-10-CM

## 2025-05-20 DIAGNOSIS — Z30.09 CONTRACEPTIVE EDUCATION: ICD-10-CM

## 2025-05-20 PROCEDURE — 0501F PRENATAL FLOW SHEET: CPT | Performed by: STUDENT IN AN ORGANIZED HEALTH CARE EDUCATION/TRAINING PROGRAM

## 2025-05-20 NOTE — LETTER
May 20, 2025     Patient: Ifeanyi Lowery   YOB: 2000   Date of Visit: 5/20/2025       To Whom It May Concern:    It is my medical opinion that Ifeanyi Lowery may return to light duty immediately with the following restrictions: acces to seating and access to restroom for frequent bathroom breaks.    If you have any questions or concerns, please don't hesitate to call.         Sincerely,        Kael Andrews MD    CC: No Recipients

## 2025-05-20 NOTE — LETTER
May 20, 2025     Patient: Ifeanyi Lowery   YOB: 2000   Date of Visit: 5/20/2025       To Whom It May Concern:    Ifeanyi Lowery was seen in my clinic on 5/20/2025 at 9:30 am. Please excuse Ifeanyi for her absence from work on this day to make the appointment.     If you have any questions or concerns, please don't hesitate to call.         Sincerely,         Kael Andrews MD        CC: No Recipients

## 2025-05-20 NOTE — PROGRESS NOTES
Ifeanyi Lowery is a 24 y.o.  at 34w5d GA here for OB visit.      Subjective     No acute complaints.  Denies vaginal bleeding, leakage of fluid, painful regular uterine contractions, decreased fetal movement.     OB History    Para Term  AB Living   2    1 0   SAB IAB Ectopic Multiple Live Births   1    0      # Outcome Date GA Lbr German/2nd Weight Sex Type Anes PTL Lv   2 Current            1 SAB                Objective   Physical Exam  Weight: 69.3 kg (152 lb 12.8 oz)  Expected Total Weight Gain: 11.5 kg (25 lb)-16 kg (35 lb)   Pregravid BMI: 24.39  BP: 116/76     --     --  Fetal Heart Rate: 145 Fundal Height (cm): 30 cm           --  Physical Exam  Constitutional:       General: She is not in acute distress.     Appearance: She is not ill-appearing, toxic-appearing or diaphoretic.   Pulmonary:      Effort: Pulmonary effort is normal.   Neurological:      Mental Status: She is alert.   Psychiatric:         Mood and Affect: Mood normal.   Vitals reviewed.          ASSESSMENT & PLAN    Ifeanyi Lowery is a 24 y.o.  at 34w5d here for the following concerns we addressed today:    Problem List Items Addressed This Visit       34 weeks gestation of pregnancy ([facility]) - Primary    Overview   Desired provider in labor: [] CNM  [] Physician  [x] Blood Products: [x] Yes, accepts [] No, needs counseling  [x] Initial BMI: 24.39   [x] Prenatal Labs:   [] Cervical Cancer Screening up to date  [x] Rh status: POS  [x] Genetic Screening:  low risk, XY  [x] NT US: (11-13 wks) WNL  [x] Baby ASA (if indicated):  [] Pregnancy dated by:     [x] Anatomy US: (19-20 wks) WNL  [] Federal Sterilization consent signed (if indicated):  [x] 1hr GCT at 24-28wks: WNL  [x] Rhogam (if indicated): N/A  [] Fetal Surveillance (if indicated):  [x] Tdap (27-32 wks, may be given up to 36 wks if initial window missed):   [x] RSV (32-36 wks) (Sept. to end of ): NA  [x] Flu Vaccine: 24    [] Breastfeeding:  [] Postpartum  Birth control method:   [] GBS at 36 - 37 wks:  [] 39 weeks discussion of IOL vs. Expectant management:  [] Mode of delivery ( anticipated ):            Other Visit Diagnoses          uterine contractions in third trimester, antepartum ([facility])          Contraceptive education                  LIYA. Post Triage visit. Only irregular UCs. SVE deferred. Contraceptive counseling provided    Follow up in 2 weeks    28 minutes was spent on the day of encounter in the professional and overall care of the patient       Kael Andrews MD

## 2025-05-20 NOTE — TELEPHONE ENCOUNTER
THIS PATIENT WILL BE EMAILING A RETURN TO WORK FORM FOR DR. TAYLOR TO FILL OUT PER PREVIOUS DISCUSSION AT 05/20/2025 OFFICE VISIT.         THANK YOU

## 2025-05-21 ENCOUNTER — TELEPHONE (OUTPATIENT)
Dept: OBSTETRICS AND GYNECOLOGY | Facility: CLINIC | Age: 25
End: 2025-05-21
Payer: COMMERCIAL

## 2025-05-21 NOTE — TELEPHONE ENCOUNTER
Message received that patient is experiencing contractions today. Call to patient. She states that she is working today and has been having nausea, a headache, pain in her back, irregular cramping that she rates a 3-4/10. Denies vaginal bleeding and loss or leak of clear thin fluid. She reports positive fetal movement. She has not taken any medication for the nausea or the headache. Reviewed with patient that she can take tylenol for the headache to see if that will help her feel better. She also reports that she has nausea medication available if she feels like she needs it. Encouraged patient to monitor symptoms at this time and advised patient I will forward her message to Dr. Andrews and let her know of any recommendations from him.  Patient is agreeable to this and denies any further questions at this time.

## 2025-05-22 ENCOUNTER — TELEPHONE (OUTPATIENT)
Dept: OBSTETRICS AND GYNECOLOGY | Facility: CLINIC | Age: 25
End: 2025-05-22
Payer: COMMERCIAL

## 2025-05-22 NOTE — TELEPHONE ENCOUNTER
Per Dr. Andrews, okay for patient to take a week off of work or start maternity leave early is she would like. Call to patient to see how she is feeling today and offer her time off options. Patient states that she is feeling better today. Today is her last day at work for the week. She would like to see how today goes and the weekend at her baby shower. She will call the office back on Monday if she feels like she needs some time off from work.

## 2025-05-22 NOTE — TELEPHONE ENCOUNTER
THIS PATIENT IS AWARE HER FMLA FORMS ARE COMPLETED BUT THE 3RD LAST SHEET WILL NEED HER SIGNATURE, THE PATIENT STATED SHE WILL HAVE HER  THE PAGE TO SIGN. THE FMLA FORMS HAVE BEEN FAXED AND SCAN IN HER CHART .      THANK YOU

## 2025-05-28 ENCOUNTER — HOSPITAL ENCOUNTER (OUTPATIENT)
Dept: RADIOLOGY | Facility: HOSPITAL | Age: 25
Discharge: HOME | End: 2025-05-28
Payer: COMMERCIAL

## 2025-05-28 ENCOUNTER — LAB (OUTPATIENT)
Dept: LAB | Facility: HOSPITAL | Age: 25
End: 2025-05-28
Payer: COMMERCIAL

## 2025-05-28 DIAGNOSIS — Z3A.34 34 WEEKS GESTATION OF PREGNANCY (HHS-HCC): Primary | ICD-10-CM

## 2025-05-28 DIAGNOSIS — Z3A.34 34 WEEKS GESTATION OF PREGNANCY (HHS-HCC): ICD-10-CM

## 2025-05-28 LAB
ERYTHROCYTE [DISTWIDTH] IN BLOOD BY AUTOMATED COUNT: 13.6 % (ref 11.5–14.5)
FERRITIN SERPL-MCNC: 25 NG/ML
FOLATE SERPL-MCNC: >24 NG/ML
HCT VFR BLD AUTO: 33.2 % (ref 36–46)
HGB BLD-MCNC: 10.9 G/DL (ref 12–16)
IRON SATN MFR SERPL: NORMAL %
IRON SERPL-MCNC: 85 UG/DL
MCH RBC QN AUTO: 30.8 PG (ref 26–34)
MCHC RBC AUTO-ENTMCNC: 32.8 G/DL (ref 32–36)
MCV RBC AUTO: 94 FL (ref 80–100)
NRBC BLD-RTO: 0 /100 WBCS (ref 0–0)
PLATELET # BLD AUTO: 278 X10*3/UL (ref 150–450)
RBC # BLD AUTO: 3.54 X10*6/UL (ref 4–5.2)
REFLEX ADDED, ANEMIA PANEL: NORMAL
TIBC SERPL-MCNC: NORMAL UG/DL
UIBC SERPL-MCNC: >450 UG/DL
VIT B12 SERPL-MCNC: 678 PG/ML
WBC # BLD AUTO: 9.3 X10*3/UL (ref 4.4–11.3)

## 2025-05-28 PROCEDURE — 76819 FETAL BIOPHYS PROFIL W/O NST: CPT | Performed by: OBSTETRICS & GYNECOLOGY

## 2025-05-28 PROCEDURE — 76816 OB US FOLLOW-UP PER FETUS: CPT

## 2025-05-28 PROCEDURE — 82728 ASSAY OF FERRITIN: CPT

## 2025-05-28 PROCEDURE — 82607 VITAMIN B-12: CPT

## 2025-05-28 PROCEDURE — 76816 OB US FOLLOW-UP PER FETUS: CPT | Performed by: OBSTETRICS & GYNECOLOGY

## 2025-05-28 PROCEDURE — 76820 UMBILICAL ARTERY ECHO: CPT

## 2025-05-28 PROCEDURE — 82746 ASSAY OF FOLIC ACID SERUM: CPT

## 2025-05-28 PROCEDURE — 83550 IRON BINDING TEST: CPT

## 2025-05-28 PROCEDURE — 36415 COLL VENOUS BLD VENIPUNCTURE: CPT

## 2025-05-28 PROCEDURE — 85027 COMPLETE CBC AUTOMATED: CPT

## 2025-05-28 PROCEDURE — 76820 UMBILICAL ARTERY ECHO: CPT | Performed by: OBSTETRICS & GYNECOLOGY

## 2025-05-29 PROBLEM — Z3A.36 36 WEEKS GESTATION OF PREGNANCY (HHS-HCC): Status: ACTIVE | Noted: 2024-11-12

## 2025-05-30 ENCOUNTER — PATIENT MESSAGE (OUTPATIENT)
Dept: OBSTETRICS AND GYNECOLOGY | Facility: CLINIC | Age: 25
End: 2025-05-30

## 2025-05-30 ENCOUNTER — TELEPHONE (OUTPATIENT)
Dept: OBSTETRICS AND GYNECOLOGY | Facility: CLINIC | Age: 25
End: 2025-05-30

## 2025-05-30 ENCOUNTER — PROCEDURE VISIT (OUTPATIENT)
Dept: OBSTETRICS AND GYNECOLOGY | Facility: CLINIC | Age: 25
End: 2025-05-30
Payer: COMMERCIAL

## 2025-05-30 ENCOUNTER — APPOINTMENT (OUTPATIENT)
Dept: OBSTETRICS AND GYNECOLOGY | Facility: CLINIC | Age: 25
End: 2025-05-30
Payer: COMMERCIAL

## 2025-05-30 VITALS — BODY MASS INDEX: 29.06 KG/M2 | DIASTOLIC BLOOD PRESSURE: 81 MMHG | WEIGHT: 153.8 LBS | SYSTOLIC BLOOD PRESSURE: 123 MMHG

## 2025-05-30 DIAGNOSIS — O36.5990 FETAL GROWTH RESTRICTION ANTEPARTUM (HHS-HCC): ICD-10-CM

## 2025-05-30 DIAGNOSIS — N94.9 GENITAL LESION, FEMALE: Primary | ICD-10-CM

## 2025-05-30 DIAGNOSIS — O99.019 ANTEPARTUM ANEMIA: ICD-10-CM

## 2025-05-30 DIAGNOSIS — O21.9 NAUSEA AND VOMITING IN PREGNANCY: ICD-10-CM

## 2025-05-30 DIAGNOSIS — O36.5930 POOR FETAL GROWTH AFFECTING MANAGEMENT OF MOTHER IN THIRD TRIMESTER, SINGLE OR UNSPECIFIED FETUS (HHS-HCC): ICD-10-CM

## 2025-05-30 DIAGNOSIS — Z3A.36 36 WEEKS GESTATION OF PREGNANCY (HHS-HCC): ICD-10-CM

## 2025-05-30 PROCEDURE — 59025 FETAL NON-STRESS TEST: CPT | Performed by: STUDENT IN AN ORGANIZED HEALTH CARE EDUCATION/TRAINING PROGRAM

## 2025-05-30 PROCEDURE — 0501F PRENATAL FLOW SHEET: CPT | Performed by: STUDENT IN AN ORGANIZED HEALTH CARE EDUCATION/TRAINING PROGRAM

## 2025-05-30 NOTE — PROGRESS NOTES
Ifeanyi Lowery is a 24 y.o.  at 36w1d GA here for OB visit.      Subjective     No acute complaints.  Denies vaginal bleeding, leakage of fluid, painful regular uterine contractions, decreased fetal movement.     OB History    Para Term  AB Living   2    1 0   SAB IAB Ectopic Multiple Live Births   1    0      # Outcome Date GA Lbr German/2nd Weight Sex Type Anes PTL Lv   2 Current            1 SAB                Objective   Physical Exam  Weight: 69.8 kg (153 lb 12.8 oz)  Expected Total Weight Gain: 11.5 kg (25 lb)-16 kg (35 lb)   Pregravid BMI: 24.39  BP: 123/81     --     --                --  Physical Exam  Constitutional:       General: She is not in acute distress.     Appearance: She is not ill-appearing, toxic-appearing or diaphoretic.   Genitourinary:      Left Labia: lesions.           Pulmonary:      Effort: Pulmonary effort is normal.   Neurological:      Mental Status: She is alert.   Psychiatric:         Mood and Affect: Mood normal.   Vitals reviewed. Exam conducted with a chaperone present.        ASSESSMENT & PLAN    Ifeanyi Lowery is a 24 y.o.  at 36w1d here for the following concerns we addressed today:    Problem List Items Addressed This Visit       36 weeks gestation of pregnancy ([facility])    Overview   Desired provider in labor: [] CNM  [] Physician  [x] Blood Products: [x] Yes, accepts [] No, needs counseling  [x] Initial BMI: 24.39   [x] Prenatal Labs:   [] Cervical Cancer Screening up to date  [x] Rh status: POS  [x] Genetic Screening:  low risk, XY  [x] NT US: (11-13 wks) WNL  [x] Baby ASA (if indicated):  [] Pregnancy dated by:     [x] Anatomy US: (19-20 wks) WNL  [x] Federal Sterilization consent signed (if indicated): NA  [x] 1hr GCT at 24-28wks: WNL  [x] Rhogam (if indicated): N/A  [x] Fetal Surveillance (if indicated): NA  [x] Tdap (27-32 wks, may be given up to 36 wks if initial window missed):   [x] RSV (32-36 wks) (Sept. to end of ): NA  [x] Flu Vaccine:  11/12/24    [x] Breastfeeding: yes  [x] Postpartum Birth control method: Fertility awareness based methods vs cu-IUD  [] GBS at 36 - 37 wks:  [] 39 weeks discussion of IOL vs. Expectant management:  [] Mode of delivery ( anticipated ):           Nausea and vomiting in pregnancy    Antepartum anemia     Other Visit Diagnoses         Genital lesion, female    -  Primary              LIYA. NST reactive. HSV swab done for new genital lesion ( likely ingrown hair)    Follow up in 1 weeks      Kael Andrews MD

## 2025-05-30 NOTE — LETTER
May 30, 2025     Patient: Ifeanyi Lowery   YOB: 2000   Date of Visit: 5/30/2025       To Whom It May Concern:    It is my medical opinion that Ifeanyi Lowery should begin maternity leave starting  6/1/2025    If you have any questions or concerns, please don't hesitate to call.         Sincerely,        Kael Andrews MD    CC: No Recipients

## 2025-05-30 NOTE — TELEPHONE ENCOUNTER
Per Dr. Andrews, patient needs an induction next Thursday, 6/5/25 at [facility] for fetal growth restriction with abnormal Dopplers.  She will be 37 weeks 0 days. Call and scheduled IOL on 6/5/25 at 8pm at [facility].    Labor Inductions orders placed per Dr. Andrews.     Call to patient, relayed IOL date/time. Its Time Compliance message sent with IOL instructions. Patient denies any further questions or concerns at this time.

## 2025-05-30 NOTE — PROCEDURES
Ifeanyi Lowery, a  at 36w1d with an THONG of 2025, by Last Menstrual Period, was seen at [facility] for a nonstress test.    Non-Stress Test   Baseline Fetal Heart Rate for Non-Stress Test: 150 BPM  Variability in Waveform for Non-Stress Test: Moderate  Accelerations in Non-Stress Test: Yes  Decelerations in Non-Stress Test: None  Contractions in Non-Stress Test: Irregular  Interpretation of Non-Stress Test   Interpretation of Non-Stress Test: Reactive

## 2025-06-02 DIAGNOSIS — O36.5990 FETAL GROWTH RESTRICTION ANTEPARTUM (HHS-HCC): Primary | ICD-10-CM

## 2025-06-02 LAB
GP B STREP SPEC QL CULT: NORMAL
HSV1 DNA SPEC QL NAA+PROBE: NOT DETECTED
HSV2 DNA SPEC QL NAA+PROBE: NOT DETECTED
SPECIMEN SOURCE: NORMAL

## 2025-06-03 ENCOUNTER — HOSPITAL ENCOUNTER (OUTPATIENT)
Dept: RADIOLOGY | Facility: HOSPITAL | Age: 25
Discharge: HOME | End: 2025-06-03
Payer: COMMERCIAL

## 2025-06-03 ENCOUNTER — APPOINTMENT (OUTPATIENT)
Dept: OBSTETRICS AND GYNECOLOGY | Facility: CLINIC | Age: 25
End: 2025-06-03
Payer: COMMERCIAL

## 2025-06-03 VITALS — WEIGHT: 153.2 LBS | DIASTOLIC BLOOD PRESSURE: 84 MMHG | SYSTOLIC BLOOD PRESSURE: 126 MMHG | BODY MASS INDEX: 28.95 KG/M2

## 2025-06-03 DIAGNOSIS — O36.5990 FETAL GROWTH RESTRICTION ANTEPARTUM (HHS-HCC): Primary | ICD-10-CM

## 2025-06-03 DIAGNOSIS — Z3A.36 36 WEEKS GESTATION OF PREGNANCY (HHS-HCC): ICD-10-CM

## 2025-06-03 DIAGNOSIS — O36.5990 FETAL GROWTH RESTRICTION ANTEPARTUM (HHS-HCC): ICD-10-CM

## 2025-06-03 PROCEDURE — 0501F PRENATAL FLOW SHEET: CPT | Performed by: STUDENT IN AN ORGANIZED HEALTH CARE EDUCATION/TRAINING PROGRAM

## 2025-06-03 PROCEDURE — 76815 OB US LIMITED FETUS(S): CPT

## 2025-06-03 PROCEDURE — 76819 FETAL BIOPHYS PROFIL W/O NST: CPT

## 2025-06-03 PROCEDURE — 76815 OB US LIMITED FETUS(S): CPT | Performed by: MEDICAL GENETICS

## 2025-06-03 PROCEDURE — 76819 FETAL BIOPHYS PROFIL W/O NST: CPT | Performed by: MEDICAL GENETICS

## 2025-06-03 PROCEDURE — 76820 UMBILICAL ARTERY ECHO: CPT | Performed by: MEDICAL GENETICS

## 2025-06-03 NOTE — PROGRESS NOTES
Ifeanyi Lowery is a 24 y.o.  at 36w5d GA here for OB visit.      Subjective     No acute complaints.  Denies vaginal bleeding, leakage of fluid, painful regular uterine contractions, decreased fetal movement.     OB History    Para Term  AB Living   2    1 0   SAB IAB Ectopic Multiple Live Births   1    0      # Outcome Date GA Lbr German/2nd Weight Sex Type Anes PTL Lv   2 Current            1 SAB                Objective   Physical Exam  Weight: 69.5 kg (153 lb 3.2 oz)  Expected Total Weight Gain: 11.5 kg (25 lb)-16 kg (35 lb)   Pregravid BMI: 24.39  BP: 126/84     --     --  Fetal Heart Rate: 130             --  Physical Exam  Constitutional:       General: She is not in acute distress.     Appearance: She is not ill-appearing, toxic-appearing or diaphoretic.   Pulmonary:      Effort: Pulmonary effort is normal.   Neurological:      Mental Status: She is alert.   Psychiatric:         Mood and Affect: Mood normal.   Vitals reviewed.          ASSESSMENT & PLAN    Ifeanyi Lowery is a 24 y.o.  at 36w5d here for the following concerns we addressed today:    Problem List Items Addressed This Visit       36 weeks gestation of pregnancy ([facility])    Overview   Desired provider in labor: [] CNM  [] Physician  [x] Blood Products: [x] Yes, accepts [] No, needs counseling  [x] Initial BMI: 24.39   [x] Prenatal Labs:   [] Cervical Cancer Screening up to date  [x] Rh status: POS  [x] Genetic Screening:  low risk, XY  [x] NT US: (11-13 wks) WNL  [x] Baby ASA (if indicated):  [x] Pregnancy dated by: LMP    [x] Anatomy US: (19-20 wks) WNL  [x] Federal Sterilization consent signed (if indicated): NA  [x] 1hr GCT at 24-28wks: WNL  [x] Rhogam (if indicated): N/A  [x] Fetal Surveillance (if indicated): NA  [x] Tdap (27-32 wks, may be given up to 36 wks if initial window missed):   [x] RSV (32-36 wks) (Sept. to end of ): NA  [x] Flu Vaccine: 24    [x] Breastfeeding: yes  [x] Postpartum Birth control  method: Interval cu-IUD  [x] GBS at 36 - 37 wks: negative  [x] 39 weeks discussion of IOL vs. Expectant management: N/A  [x] Mode of delivery ( anticipated ): IOL         Fetal growth restriction antepartum ([facility]) - Primary    Overview   FGR based on 5/28 US AC 7%, with EFW 10%, elevated doppler indices, recommendation for IOL at 37.0 with twice weekly NSTs.              LIYA. US today.  IOL on Thursday for FGR with abn dopplers    Follow up in 2 weeks for PP visit and 6 weeks for  IUD placement      Kael Andrews MD

## 2025-06-05 ENCOUNTER — ANESTHESIA EVENT (OUTPATIENT)
Dept: OBSTETRICS AND GYNECOLOGY | Facility: HOSPITAL | Age: 25
End: 2025-06-05
Payer: COMMERCIAL

## 2025-06-05 ENCOUNTER — HOSPITAL ENCOUNTER (INPATIENT)
Facility: HOSPITAL | Age: 25
LOS: 3 days | Discharge: ANOTHER HOSPITAL | End: 2025-06-08
Attending: OBSTETRICS & GYNECOLOGY | Admitting: OBSTETRICS & GYNECOLOGY
Payer: COMMERCIAL

## 2025-06-05 ENCOUNTER — ANESTHESIA (OUTPATIENT)
Dept: OBSTETRICS AND GYNECOLOGY | Facility: HOSPITAL | Age: 25
End: 2025-06-05
Payer: COMMERCIAL

## 2025-06-05 ENCOUNTER — APPOINTMENT (OUTPATIENT)
Dept: OBSTETRICS AND GYNECOLOGY | Facility: HOSPITAL | Age: 25
End: 2025-06-05
Payer: COMMERCIAL

## 2025-06-05 DIAGNOSIS — O36.5990 FETAL GROWTH RESTRICTION ANTEPARTUM (HHS-HCC): ICD-10-CM

## 2025-06-05 DIAGNOSIS — Z98.891 STATUS POST CESAREAN SECTION: Primary | ICD-10-CM

## 2025-06-05 DIAGNOSIS — Z3A.36 36 WEEKS GESTATION OF PREGNANCY (HHS-HCC): ICD-10-CM

## 2025-06-05 PROBLEM — Z34.90 ENCOUNTER FOR ELECTIVE INDUCTION OF LABOR: Status: ACTIVE | Noted: 2025-06-05

## 2025-06-05 LAB
ABO GROUP (TYPE) IN BLOOD: NORMAL
ANTIBODY SCREEN: NORMAL
ERYTHROCYTE [DISTWIDTH] IN BLOOD BY AUTOMATED COUNT: 13.9 % (ref 11.5–14.5)
HCT VFR BLD AUTO: 29.2 % (ref 36–46)
HGB BLD-MCNC: 10 G/DL (ref 12–16)
MCH RBC QN AUTO: 31.4 PG (ref 26–34)
MCHC RBC AUTO-ENTMCNC: 34.2 G/DL (ref 32–36)
MCV RBC AUTO: 92 FL (ref 80–100)
NRBC BLD-RTO: 0 /100 WBCS (ref 0–0)
PLATELET # BLD AUTO: 251 X10*3/UL (ref 150–450)
RBC # BLD AUTO: 3.18 X10*6/UL (ref 4–5.2)
RH FACTOR (ANTIGEN D): NORMAL
WBC # BLD AUTO: 10.8 X10*3/UL (ref 4.4–11.3)

## 2025-06-05 PROCEDURE — 85027 COMPLETE CBC AUTOMATED: CPT | Performed by: OBSTETRICS & GYNECOLOGY

## 2025-06-05 PROCEDURE — 86901 BLOOD TYPING SEROLOGIC RH(D): CPT | Performed by: OBSTETRICS & GYNECOLOGY

## 2025-06-05 PROCEDURE — 36415 COLL VENOUS BLD VENIPUNCTURE: CPT | Performed by: OBSTETRICS & GYNECOLOGY

## 2025-06-05 PROCEDURE — 2500000001 HC RX 250 WO HCPCS SELF ADMINISTERED DRUGS (ALT 637 FOR MEDICARE OP): Performed by: OBSTETRICS & GYNECOLOGY

## 2025-06-05 PROCEDURE — 86780 TREPONEMA PALLIDUM: CPT | Mod: AHULAB | Performed by: OBSTETRICS & GYNECOLOGY

## 2025-06-05 PROCEDURE — 1120000001 HC OB PRIVATE ROOM DAILY

## 2025-06-05 RX ORDER — ONDANSETRON HYDROCHLORIDE 2 MG/ML
4 INJECTION, SOLUTION INTRAVENOUS EVERY 6 HOURS PRN
Status: DISCONTINUED | OUTPATIENT
Start: 2025-06-05 | End: 2025-06-07

## 2025-06-05 RX ORDER — CALCIUM CARBONATE 200(500)MG
1 TABLET,CHEWABLE ORAL EVERY 6 HOURS PRN
Status: DISCONTINUED | OUTPATIENT
Start: 2025-06-05 | End: 2025-06-07

## 2025-06-05 RX ORDER — ONDANSETRON 4 MG/1
4 TABLET, FILM COATED ORAL EVERY 6 HOURS PRN
Status: DISCONTINUED | OUTPATIENT
Start: 2025-06-05 | End: 2025-06-07

## 2025-06-05 RX ADMIN — MISOPROSTOL 25 MCG: 100 TABLET ORAL at 22:05

## 2025-06-05 SDOH — HEALTH STABILITY: MENTAL HEALTH: SUICIDAL BEHAVIOR (LIFETIME): NO

## 2025-06-05 SDOH — SOCIAL STABILITY: SOCIAL INSECURITY: ABUSE SCREEN: ADULT

## 2025-06-05 SDOH — ECONOMIC STABILITY: FOOD INSECURITY: WITHIN THE PAST 12 MONTHS, YOU WORRIED THAT YOUR FOOD WOULD RUN OUT BEFORE YOU GOT THE MONEY TO BUY MORE.: NEVER TRUE

## 2025-06-05 SDOH — SOCIAL STABILITY: SOCIAL INSECURITY: DOES ANYONE TRY TO KEEP YOU FROM HAVING/CONTACTING OTHER FRIENDS OR DOING THINGS OUTSIDE YOUR HOME?: NO

## 2025-06-05 SDOH — SOCIAL STABILITY: SOCIAL INSECURITY: WITHIN THE LAST YEAR, HAVE YOU BEEN AFRAID OF YOUR PARTNER OR EX-PARTNER?: NO

## 2025-06-05 SDOH — ECONOMIC STABILITY: FOOD INSECURITY: WITHIN THE PAST 12 MONTHS, THE FOOD YOU BOUGHT JUST DIDN'T LAST AND YOU DIDN'T HAVE MONEY TO GET MORE.: NEVER TRUE

## 2025-06-05 SDOH — SOCIAL STABILITY: SOCIAL INSECURITY
WITHIN THE LAST YEAR, HAVE YOU BEEN KICKED, HIT, SLAPPED, OR OTHERWISE PHYSICALLY HURT BY YOUR PARTNER OR EX-PARTNER?: NO

## 2025-06-05 SDOH — ECONOMIC STABILITY: HOUSING INSECURITY: DO YOU FEEL UNSAFE GOING BACK TO THE PLACE WHERE YOU ARE LIVING?: NO

## 2025-06-05 SDOH — SOCIAL STABILITY: SOCIAL INSECURITY: WITHIN THE LAST YEAR, HAVE YOU BEEN HUMILIATED OR EMOTIONALLY ABUSED IN OTHER WAYS BY YOUR PARTNER OR EX-PARTNER?: NO

## 2025-06-05 SDOH — SOCIAL STABILITY: SOCIAL INSECURITY
WITHIN THE LAST YEAR, HAVE YOU BEEN RAPED OR FORCED TO HAVE ANY KIND OF SEXUAL ACTIVITY BY YOUR PARTNER OR EX-PARTNER?: NO

## 2025-06-05 SDOH — HEALTH STABILITY: MENTAL HEALTH: NON-SPECIFIC ACTIVE SUICIDAL THOUGHTS (PAST 1 MONTH): NO

## 2025-06-05 SDOH — SOCIAL STABILITY: SOCIAL INSECURITY: VERBAL ABUSE: DENIES

## 2025-06-05 SDOH — ECONOMIC STABILITY: FOOD INSECURITY: HOW HARD IS IT FOR YOU TO PAY FOR THE VERY BASICS LIKE FOOD, HOUSING, MEDICAL CARE, AND HEATING?: NOT HARD AT ALL

## 2025-06-05 SDOH — SOCIAL STABILITY: SOCIAL INSECURITY: ARE THERE ANY APPARENT SIGNS OF INJURIES/BEHAVIORS THAT COULD BE RELATED TO ABUSE/NEGLECT?: NO

## 2025-06-05 SDOH — HEALTH STABILITY: MENTAL HEALTH: WISH TO BE DEAD (PAST 1 MONTH): NO

## 2025-06-05 SDOH — HEALTH STABILITY: MENTAL HEALTH: HAVE YOU USED ANY SUBSTANCES (CANABIS, COCAINE, HEROIN, HALLUCINOGENS, INHALANTS, ETC.) IN THE PAST 12 MONTHS?: NO

## 2025-06-05 SDOH — HEALTH STABILITY: MENTAL HEALTH: HAVE YOU USED ANY PRESCRIPTION DRUGS OTHER THAN PRESCRIBED IN THE PAST 12 MONTHS?: NO

## 2025-06-05 SDOH — SOCIAL STABILITY: SOCIAL INSECURITY: ARE YOU OR HAVE YOU BEEN THREATENED OR ABUSED PHYSICALLY, EMOTIONALLY, OR SEXUALLY BY ANYONE?: NO

## 2025-06-05 SDOH — ECONOMIC STABILITY: TRANSPORTATION INSECURITY: IN THE PAST 12 MONTHS, HAS LACK OF TRANSPORTATION KEPT YOU FROM MEDICAL APPOINTMENTS OR FROM GETTING MEDICATIONS?: NO

## 2025-06-05 SDOH — SOCIAL STABILITY: SOCIAL INSECURITY: DO YOU FEEL ANYONE HAS EXPLOITED OR TAKEN ADVANTAGE OF YOU FINANCIALLY OR OF YOUR PERSONAL PROPERTY?: NO

## 2025-06-05 SDOH — SOCIAL STABILITY: SOCIAL INSECURITY: PHYSICAL ABUSE: DENIES

## 2025-06-05 SDOH — SOCIAL STABILITY: SOCIAL INSECURITY: HAVE YOU HAD THOUGHTS OF HARMING ANYONE ELSE?: NO

## 2025-06-05 SDOH — SOCIAL STABILITY: SOCIAL INSECURITY: HAS ANYONE EVER THREATENED TO HURT YOUR FAMILY OR YOUR PETS?: NO

## 2025-06-05 SDOH — HEALTH STABILITY: MENTAL HEALTH: WERE YOU ABLE TO COMPLETE ALL THE BEHAVIORAL HEALTH SCREENINGS?: YES

## 2025-06-05 SDOH — SOCIAL STABILITY: SOCIAL INSECURITY: HAVE YOU HAD ANY THOUGHTS OF HARMING ANYONE ELSE?: NO

## 2025-06-05 SDOH — HEALTH STABILITY: MENTAL HEALTH: CURRENT SMOKER: 0

## 2025-06-05 ASSESSMENT — ACTIVITIES OF DAILY LIVING (ADL): LACK_OF_TRANSPORTATION: NO

## 2025-06-05 ASSESSMENT — LIFESTYLE VARIABLES
SKIP TO QUESTIONS 9-10: 1
HOW OFTEN DO YOU HAVE 6 OR MORE DRINKS ON ONE OCCASION: NEVER
HOW MANY STANDARD DRINKS CONTAINING ALCOHOL DO YOU HAVE ON A TYPICAL DAY: PATIENT DOES NOT DRINK
HOW OFTEN DO YOU HAVE A DRINK CONTAINING ALCOHOL: NEVER
AUDIT-C TOTAL SCORE: 0
AUDIT-C TOTAL SCORE: 0

## 2025-06-05 ASSESSMENT — PAIN SCALES - GENERAL
PAINLEVEL_OUTOF10: 0 - NO PAIN

## 2025-06-05 ASSESSMENT — PATIENT HEALTH QUESTIONNAIRE - PHQ9
2. FEELING DOWN, DEPRESSED OR HOPELESS: NOT AT ALL
1. LITTLE INTEREST OR PLEASURE IN DOING THINGS: NOT AT ALL
SUM OF ALL RESPONSES TO PHQ9 QUESTIONS 1 & 2: 0

## 2025-06-06 ENCOUNTER — ANESTHESIA EVENT (OUTPATIENT)
Dept: OBSTETRICS AND GYNECOLOGY | Facility: HOSPITAL | Age: 25
End: 2025-06-06
Payer: COMMERCIAL

## 2025-06-06 ENCOUNTER — ANESTHESIA (OUTPATIENT)
Dept: OBSTETRICS AND GYNECOLOGY | Facility: HOSPITAL | Age: 25
End: 2025-06-06
Payer: COMMERCIAL

## 2025-06-06 LAB — TREPONEMA PALLIDUM IGG+IGM AB [PRESENCE] IN SERUM OR PLASMA BY IMMUNOASSAY: NONREACTIVE

## 2025-06-06 PROCEDURE — 7210000002 HC LABOR PER HOUR

## 2025-06-06 PROCEDURE — 3700000014 EPIDURAL BLOCK: Performed by: NURSE ANESTHETIST, CERTIFIED REGISTERED

## 2025-06-06 PROCEDURE — 2500000004 HC RX 250 GENERAL PHARMACY W/ HCPCS (ALT 636 FOR OP/ED): Performed by: NURSE ANESTHETIST, CERTIFIED REGISTERED

## 2025-06-06 PROCEDURE — 80053 COMPREHEN METABOLIC PANEL: CPT | Performed by: NURSE PRACTITIONER

## 2025-06-06 PROCEDURE — 36415 COLL VENOUS BLD VENIPUNCTURE: CPT | Performed by: OBSTETRICS & GYNECOLOGY

## 2025-06-06 PROCEDURE — 2500000005 HC RX 250 GENERAL PHARMACY W/O HCPCS: Performed by: ADVANCED PRACTICE MIDWIFE

## 2025-06-06 PROCEDURE — 2500000004 HC RX 250 GENERAL PHARMACY W/ HCPCS (ALT 636 FOR OP/ED): Performed by: OBSTETRICS & GYNECOLOGY

## 2025-06-06 PROCEDURE — 1120000001 HC OB PRIVATE ROOM DAILY

## 2025-06-06 PROCEDURE — 2500000004 HC RX 250 GENERAL PHARMACY W/ HCPCS (ALT 636 FOR OP/ED): Performed by: ADVANCED PRACTICE MIDWIFE

## 2025-06-06 PROCEDURE — 83615 LACTATE (LD) (LDH) ENZYME: CPT | Performed by: NURSE PRACTITIONER

## 2025-06-06 PROCEDURE — 59050 FETAL MONITOR W/REPORT: CPT

## 2025-06-06 PROCEDURE — 85027 COMPLETE CBC AUTOMATED: CPT | Performed by: NURSE PRACTITIONER

## 2025-06-06 RX ORDER — CARBOPROST TROMETHAMINE 250 UG/ML
250 INJECTION, SOLUTION INTRAMUSCULAR ONCE AS NEEDED
Status: DISCONTINUED | OUTPATIENT
Start: 2025-06-06 | End: 2025-06-07

## 2025-06-06 RX ORDER — NALBUPHINE HYDROCHLORIDE 10 MG/ML
10 INJECTION INTRAMUSCULAR; INTRAVENOUS; SUBCUTANEOUS ONCE
Status: COMPLETED | OUTPATIENT
Start: 2025-06-06 | End: 2025-06-06

## 2025-06-06 RX ORDER — LOPERAMIDE HYDROCHLORIDE 2 MG/1
4 CAPSULE ORAL EVERY 2 HOUR PRN
Status: DISCONTINUED | OUTPATIENT
Start: 2025-06-06 | End: 2025-06-07

## 2025-06-06 RX ORDER — LIDOCAINE HYDROCHLORIDE 10 MG/ML
20 INJECTION, SOLUTION INFILTRATION; PERINEURAL ONCE AS NEEDED
Status: DISCONTINUED | OUTPATIENT
Start: 2025-06-06 | End: 2025-06-07

## 2025-06-06 RX ORDER — OXYTOCIN 10 [USP'U]/ML
10 INJECTION, SOLUTION INTRAMUSCULAR; INTRAVENOUS ONCE AS NEEDED
Status: DISCONTINUED | OUTPATIENT
Start: 2025-06-06 | End: 2025-06-07

## 2025-06-06 RX ORDER — SODIUM CHLORIDE, SODIUM LACTATE, POTASSIUM CHLORIDE, CALCIUM CHLORIDE 600; 310; 30; 20 MG/100ML; MG/100ML; MG/100ML; MG/100ML
75 INJECTION, SOLUTION INTRAVENOUS CONTINUOUS
Status: DISCONTINUED | OUTPATIENT
Start: 2025-06-06 | End: 2025-06-07

## 2025-06-06 RX ORDER — TRANEXAMIC ACID 1 G/10ML
1000 INJECTION, SOLUTION INTRAVENOUS ONCE AS NEEDED
Status: DISCONTINUED | OUTPATIENT
Start: 2025-06-06 | End: 2025-06-07

## 2025-06-06 RX ORDER — TERBUTALINE SULFATE 1 MG/ML
0.25 INJECTION SUBCUTANEOUS ONCE AS NEEDED
Status: DISCONTINUED | OUTPATIENT
Start: 2025-06-06 | End: 2025-06-07

## 2025-06-06 RX ORDER — MISOPROSTOL 200 UG/1
800 TABLET ORAL ONCE AS NEEDED
Status: DISCONTINUED | OUTPATIENT
Start: 2025-06-06 | End: 2025-06-07

## 2025-06-06 RX ORDER — HYDRALAZINE HYDROCHLORIDE 20 MG/ML
5 INJECTION INTRAMUSCULAR; INTRAVENOUS ONCE AS NEEDED
Status: DISCONTINUED | OUTPATIENT
Start: 2025-06-06 | End: 2025-06-07

## 2025-06-06 RX ORDER — FENTANYL/ROPIVACAINE/NS/PF 2MCG/ML-.2
0-25 PLASTIC BAG, INJECTION (ML) INJECTION CONTINUOUS
Status: DISCONTINUED | OUTPATIENT
Start: 2025-06-06 | End: 2025-06-07

## 2025-06-06 RX ORDER — OXYTOCIN/0.9 % SODIUM CHLORIDE 30/500 ML
2-30 PLASTIC BAG, INJECTION (ML) INTRAVENOUS CONTINUOUS
Status: DISCONTINUED | OUTPATIENT
Start: 2025-06-06 | End: 2025-06-07

## 2025-06-06 RX ORDER — METHYLERGONOVINE MALEATE 0.2 MG/ML
0.2 INJECTION INTRAVENOUS ONCE AS NEEDED
Status: DISCONTINUED | OUTPATIENT
Start: 2025-06-06 | End: 2025-06-07

## 2025-06-06 RX ORDER — LIDOCAINE HYDROCHLORIDE AND EPINEPHRINE 15; 5 MG/ML; UG/ML
INJECTION, SOLUTION EPIDURAL AS NEEDED
Status: DISCONTINUED | OUTPATIENT
Start: 2025-06-06 | End: 2025-06-07

## 2025-06-06 RX ORDER — OXYTOCIN/0.9 % SODIUM CHLORIDE 30/500 ML
60 PLASTIC BAG, INJECTION (ML) INTRAVENOUS ONCE AS NEEDED
Status: DISCONTINUED | OUTPATIENT
Start: 2025-06-06 | End: 2025-06-07

## 2025-06-06 RX ORDER — LABETALOL HYDROCHLORIDE 5 MG/ML
20 INJECTION, SOLUTION INTRAVENOUS ONCE AS NEEDED
Status: DISCONTINUED | OUTPATIENT
Start: 2025-06-06 | End: 2025-06-07

## 2025-06-06 RX ADMIN — Medication: at 19:38

## 2025-06-06 RX ADMIN — LIDOCAINE HYDROCHLORIDE,EPINEPHRINE BITARTRATE 2 ML: 15; .005 INJECTION, SOLUTION EPIDURAL; INFILTRATION; INTRACAUDAL; PERINEURAL at 23:00

## 2025-06-06 RX ADMIN — Medication: at 11:30

## 2025-06-06 RX ADMIN — SODIUM CHLORIDE, POTASSIUM CHLORIDE, SODIUM LACTATE AND CALCIUM CHLORIDE 500 ML: 600; 310; 30; 20 INJECTION, SOLUTION INTRAVENOUS at 02:15

## 2025-06-06 RX ADMIN — SODIUM CHLORIDE, POTASSIUM CHLORIDE, SODIUM LACTATE AND CALCIUM CHLORIDE 75 ML/HR: 600; 310; 30; 20 INJECTION, SOLUTION INTRAVENOUS at 19:56

## 2025-06-06 RX ADMIN — SODIUM CHLORIDE, POTASSIUM CHLORIDE, SODIUM LACTATE AND CALCIUM CHLORIDE 500 ML: 600; 310; 30; 20 INJECTION, SOLUTION INTRAVENOUS at 22:41

## 2025-06-06 RX ADMIN — NALBUPHINE HYDROCHLORIDE 10 MG: 10 INJECTION INTRAMUSCULAR; INTRAVENOUS; SUBCUTANEOUS at 17:38

## 2025-06-06 RX ADMIN — SODIUM CHLORIDE, POTASSIUM CHLORIDE, SODIUM LACTATE AND CALCIUM CHLORIDE 75 ML/HR: 600; 310; 30; 20 INJECTION, SOLUTION INTRAVENOUS at 07:29

## 2025-06-06 RX ADMIN — LIDOCAINE HYDROCHLORIDE,EPINEPHRINE BITARTRATE 3 ML: 15; .005 INJECTION, SOLUTION EPIDURAL; INFILTRATION; INTRACAUDAL; PERINEURAL at 22:58

## 2025-06-06 RX ADMIN — ONDANSETRON 4 MG: 2 INJECTION INTRAMUSCULAR; INTRAVENOUS at 07:48

## 2025-06-06 RX ADMIN — Medication 3 ML: at 23:02

## 2025-06-06 RX ADMIN — SODIUM CHLORIDE, POTASSIUM CHLORIDE, SODIUM LACTATE AND CALCIUM CHLORIDE 75 ML/HR: 600; 310; 30; 20 INJECTION, SOLUTION INTRAVENOUS at 15:30

## 2025-06-06 RX ADMIN — Medication 2 MILLI-UNITS/MIN: at 17:39

## 2025-06-06 RX ADMIN — Medication 8 ML/HR: at 23:03

## 2025-06-06 RX ADMIN — NALBUPHINE HYDROCHLORIDE 10 MG: 10 INJECTION INTRAMUSCULAR; INTRAVENOUS; SUBCUTANEOUS at 08:47

## 2025-06-06 SDOH — HEALTH STABILITY: MENTAL HEALTH: CURRENT SMOKER: 0

## 2025-06-06 ASSESSMENT — PAIN SCALES - GENERAL

## 2025-06-06 NOTE — ANESTHESIA PREPROCEDURE EVALUATION
Patient: Ifeanyi Lowery    Evaluation Method: In-person visit    Procedure Information    Date: 06/05/25  Procedure: Labor Consult         Relevant Problems   Hematology   (+) Anemia   (+) Antepartum anemia      GYN   (+) 36 weeks gestation of pregnancy ([facility])       Clinical information reviewed:                   NPO Detail:  No data recorded     OB/Gyn Evaluation    Present Pregnancy    Patient is pregnant now.   Obstetric History                Physical Exam    Airway  Mallampati: I  TM distance: >3 FB  Neck ROM: full  Mouth opening: 3 or more finger widths     Cardiovascular - normal exam  Rhythm: regular     Dental - normal exam     Pulmonary - normal examBreath sounds clear to auscultation     Abdominal Abdomen: soft             Anesthesia Plan    History of general anesthesia?: no  History of complications of general anesthesia?: no    ASA 2     epidural     The patient is not a current smoker.  Patient was not previously instructed to abstain from smoking on day of procedure.  Patient did not smoke on day of procedure.    Anesthetic plan and risks discussed with patient.

## 2025-06-06 NOTE — PROGRESS NOTES
Intrapartum Progress Note    Subjective   Working hard with contractions and great family support    Objective   Last Vitals:  Temp Pulse Resp BP MAP Pulse Ox   36.4 °C (97.5 °F) 88 18 124/86   99 %     Physical Examination:  , mod allan, no accels, no decels  [place]: q 2  VE: FT/80/-1  Crb placed without difficulty    Lab Review:  Lab Results   Component Value Date    WBC 10.8 2025    HGB 10.0 (L) 2025    HCT 29.2 (L) 2025     2025     Assessment   Ifeanyi RADHA Lowery is a 24 y.o.  at 37w1d  IOL, cervical ripening continues  Fht cat 1  Fgr  Gbs neg    Plan   Continue crb. Start pit if ctx's space  Cefm  Pain meds as desired  Anticipate

## 2025-06-06 NOTE — H&P
OB Admission H&P    Assessment/Plan    Ifeanyi Lowery is a 24 y.o.  at 37w0d, THONG: 2025, by Last Menstrual Period, who presents for Induction of Labor.    Plan  -Admit to L&D, consented  -T&S, CBC, and Syphilis  -Epidural at patient request  -Recheck as clinically indicated by maternal or fetal status  -Plan to initiate induction with cytotec    Fetal Status  -NST reactive, reassuring   -Presentation VTX based on ultrasound  -EFW 5# by US 6/3  -GBS negative    Postpartum  Contraception Plan: IUD at 6 week PP visit    Pregnancy Problems (from 24 to present)       Problem Noted Diagnosed Resolved    Encounter for elective induction of labor 2025 by Sirisha Tineo MD  No    Priority:  Medium       Fetal growth restriction antepartum ([facility]) 2025 by Kael Andrews MD  No    Priority:  Medium       Overview Signed 2025  1:32 PM by Kael Andrews MD   FGR based on  US AC 7%, with EFW 10%, elevated doppler indices, recommendation for IOL at 37.0 with twice weekly NSTs.         Antepartum anemia 3/6/2025 by Kael Andrews MD  No    Priority:  Medium       Nausea and vomiting in pregnancy 12/10/2024 by Kael Andrews MD  No    Priority:  Medium       36 weeks gestation of pregnancy ([facility]) 2024 by Kael Andrews MD  No    Priority:  Medium       Overview Addendum 6/3/2025 10:52 AM by Kael Andrews MD   Desired provider in labor: [] CNM  [] Physician  [x] Blood Products: [x] Yes, accepts [] No, needs counseling  [x] Initial BMI: 24.39   [x] Prenatal Labs:   [] Cervical Cancer Screening up to date  [x] Rh status: POS  [x] Genetic Screening:  low risk, XY  [x] NT US: (11-13 wks) WNL  [x] Baby ASA (if indicated):  [x] Pregnancy dated by: LMP    [x] Anatomy US: (19-20 wks) WNL  [x] Federal Sterilization consent signed (if indicated): NA  [x] 1hr GCT at 24-28wks: WNL  [x] Rhogam (if indicated): N/A  [x] Fetal Surveillance (if indicated): NA  [x] Tdap  (27-32 wks, may be given up to 36 wks if initial window missed):   [x] RSV (32-36 wks) (Sept. to end ): NA  [x] Flu Vaccine: 24    [x] Breastfeeding: yes  [x] Postpartum Birth control method: Interval cu-IUD  [x] GBS at 36 - 37 wks: negative  [x] 39 weeks discussion of IOL vs. Expectant management: N/A  [x] Mode of delivery ( anticipated ): IOL                 Subjective   Good fetal movement.  Denies vaginal bleeding., Denies leaking of fluid.  , CTX since yesterday evening    Prenatal Provider Dr Andrews    OB History    Para Term  AB Living   2 0 0 0 1 0   SAB IAB Ectopic Multiple Live Births   1 0 0 0 0      # Outcome Date GA Lbr German/2nd Weight Sex Type Anes PTL Lv   2 Current            1 SAB                Surgical History[1]    Social History     Tobacco Use    Smoking status: Never     Passive exposure: Never    Smokeless tobacco: Never   Substance Use Topics    Alcohol use: Not Currently       Allergies[2]    Prescriptions Prior to Admission[3]  Objective     Last Vitals  Temp Pulse Resp BP MAP O2 Sat                   Blood Pressures    No data found in the last 1 encounters.          Physical Exam  General: NAD, mood appropriate  Cardiopulmonary: warm and well perfused, breathing comfortably on room air  Abdomen: Gravid, non-tender  Extremities: Symmetric  Vulva left labia majora lesion - HSV culture negative 2025  Speculum Exam: deferred  Cervix: Closed /50 /-2     Chaperone Present: Yes.  Chaperone Name/Title: MAGUI Lam  Examination Chaperoned: Gynecological Exam     Fetal Monitoring  Baseline: 140 bpm, Variability: moderate,  Accelerations: present and Decelerations: none  Uterine Activity: Contractions present  Interpretation: Category 1    Bedside ultrasound: Yes    Labs in chart were reviewed.          Prenatal labs reviewed, not remarkable.             [1]   Past Surgical History:  Procedure Laterality Date    NO PAST SURGERIES     [2] No Known Allergies  [3]    Medications Prior to Admission   Medication Sig Dispense Refill Last Dose/Taking    ferrous sulfate 325 (65 Fe) mg EC tablet Take 1 tablet by mouth once daily with breakfast. Do not crush, chew, or split. 90 tablet 3 6/5/2025    prenatal no115/iron/folic acid (PRENATAL 19 ORAL) Take by mouth.   6/5/2025

## 2025-06-06 NOTE — CARE PLAN
The patient's goals for the shift include mother and baby safety    The clinical goals for the shift include mother and baby safety    Over the shift, the patient did not make progress toward the following goals. Barriers to progression include none. Recommendations to address these barriers include none.

## 2025-06-06 NOTE — PROGRESS NOTES
Intrapartum Progress Note    Assessment/Plan   Ifeanyi Lowery is a 24 y.o.  at 37w1d. THONG: 2025, by Last Menstrual Period.   Cat I EFM-->cEFM   Elevated blood pressure reading in labor, follow closely  IOL  CRB-->cyto-->Pitocin per protocol       Subjective   Pt coping well hoping to avoid epidural.     Objective   Last Vitals:  Temp Pulse Resp BP MAP Pulse Ox   37.3 °C (99.1 °F) 109 20 (!) 141/81 99 100 %     Vitals Min/Max Last 24 Hours:  Temp  Min: 36.1 °C (97 °F)  Max: 37.3 °C (99.1 °F)  Pulse  Min: 69  Max: 109  Resp  Min: 18  Max: 20  BP  Min: 121/77  Max: 143/87  MAP (mmHg)  Min: 90  Max: 107    Intake/Output:  No intake or output data in the 24 hours ending 25    Physical Examination:  GENERAL: Examination reveals a well developed, well nourished, gravid female in no acute distress. She is alert and cooperative.  FHR is 155  , with moderate availability Accelerations, and a   1 tracing.    [place] reading:   irregular contractions     Exam deferred     Lab Review:  Lab Results   Component Value Date    ABO O 2025    LABRH POS 2025    ABSCRN NEG 2025     Lab Results   Component Value Date    WBC 10.8 2025    HGB 10.0 (L) 2025    HCT 29.2 (L) 2025     2025     0   Lab Value Date/Time    GRPBSTREP SEE NOTE 2025 5825

## 2025-06-06 NOTE — PROGRESS NOTES
Pt getting uncomfortable with CTX, requesting cervical exam    Cervix closed (now external os is open)/50/-2    Category 1 tracing for the last 90 minutes  Did have intermittent variable and late decels previously  CTX q 1-2 minutes    Will hold on second cytotec  Pt declines pain medication

## 2025-06-06 NOTE — SIGNIFICANT EVENT
Pt requesting ve    Fht 145, mod allan, +accels, no decels  [place]: 2-6  Ve: 4//-2    Iup at 37.1  IOL, latent phase  Fht cat 1  Fgr    Start pit per protocol  Cefm  Pain meds as desired  Anticipate

## 2025-06-07 LAB
ALBUMIN SERPL BCP-MCNC: 2.8 G/DL (ref 3.4–5)
ALBUMIN SERPL BCP-MCNC: 3.1 G/DL (ref 3.4–5)
ALP SERPL-CCNC: 393 U/L (ref 33–110)
ALP SERPL-CCNC: 433 U/L (ref 33–110)
ALT SERPL W P-5'-P-CCNC: 11 U/L (ref 7–45)
ALT SERPL W P-5'-P-CCNC: 9 U/L (ref 7–45)
ANION GAP SERPL CALC-SCNC: 11 MMOL/L (ref 10–20)
ANION GAP SERPL CALC-SCNC: 16 MMOL/L (ref 10–20)
AST SERPL W P-5'-P-CCNC: 17 U/L (ref 9–39)
AST SERPL W P-5'-P-CCNC: 18 U/L (ref 9–39)
BASE EXCESS BLDCOA CALC-SCNC: -6.8 MMOL/L (ref -10.8–-0.5)
BASE EXCESS BLDCOV CALC-SCNC: -4.2 MMOL/L (ref -8.1–-0.5)
BILIRUB SERPL-MCNC: 0.4 MG/DL (ref 0–1.2)
BILIRUB SERPL-MCNC: 0.5 MG/DL (ref 0–1.2)
BODY TEMPERATURE: 37 DEGREES CELSIUS
BODY TEMPERATURE: 37 DEGREES CELSIUS
BUN SERPL-MCNC: 4 MG/DL (ref 6–23)
BUN SERPL-MCNC: 4 MG/DL (ref 6–23)
CALCIUM SERPL-MCNC: 8 MG/DL (ref 8.6–10.3)
CALCIUM SERPL-MCNC: 8.7 MG/DL (ref 8.6–10.3)
CHLORIDE SERPL-SCNC: 103 MMOL/L (ref 98–107)
CHLORIDE SERPL-SCNC: 104 MMOL/L (ref 98–107)
CO2 SERPL-SCNC: 19 MMOL/L (ref 21–32)
CO2 SERPL-SCNC: 22 MMOL/L (ref 21–32)
CREAT SERPL-MCNC: 0.52 MG/DL (ref 0.5–1.05)
CREAT SERPL-MCNC: 0.64 MG/DL (ref 0.5–1.05)
CREAT UR-MCNC: 20.7 MG/DL (ref 20–320)
EGFRCR SERPLBLD CKD-EPI 2021: >90 ML/MIN/1.73M*2
EGFRCR SERPLBLD CKD-EPI 2021: >90 ML/MIN/1.73M*2
ERYTHROCYTE [DISTWIDTH] IN BLOOD BY AUTOMATED COUNT: 14.1 % (ref 11.5–14.5)
ERYTHROCYTE [DISTWIDTH] IN BLOOD BY AUTOMATED COUNT: 14.3 % (ref 11.5–14.5)
GLUCOSE SERPL-MCNC: 119 MG/DL (ref 74–99)
GLUCOSE SERPL-MCNC: 87 MG/DL (ref 74–99)
HCO3 BLDCOA-SCNC: 21.6 MMOL/L (ref 15–29)
HCO3 BLDCOV-SCNC: 24.8 MMOL/L (ref 16–26)
HCT VFR BLD AUTO: 28.9 % (ref 36–46)
HCT VFR BLD AUTO: 29.8 % (ref 36–46)
HGB BLD-MCNC: 9.6 G/DL (ref 12–16)
HGB BLD-MCNC: 9.7 G/DL (ref 12–16)
INHALED O2 CONCENTRATION: 21 %
INHALED O2 CONCENTRATION: 21 %
LDH SERPL L TO P-CCNC: 168 U/L (ref 84–246)
MCH RBC QN AUTO: 31 PG (ref 26–34)
MCH RBC QN AUTO: 31.2 PG (ref 26–34)
MCHC RBC AUTO-ENTMCNC: 32.2 G/DL (ref 32–36)
MCHC RBC AUTO-ENTMCNC: 33.6 G/DL (ref 32–36)
MCV RBC AUTO: 93 FL (ref 80–100)
MCV RBC AUTO: 96 FL (ref 80–100)
NRBC BLD-RTO: 0 /100 WBCS (ref 0–0)
NRBC BLD-RTO: 0 /100 WBCS (ref 0–0)
OXYHGB MFR BLDCOA: 87.6 % (ref 94–98)
OXYHGB MFR BLDCOV: 45.2 % (ref 94–98)
PCO2 BLDCOA: 54 MM HG (ref 31–75)
PCO2 BLDCOV: 62 MM HG (ref 22–53)
PH BLDCOA: 7.21 PH (ref 7.08–7.39)
PH BLDCOV: 7.21 PH (ref 7.19–7.47)
PLATELET # BLD AUTO: 225 X10*3/UL (ref 150–450)
PLATELET # BLD AUTO: 225 X10*3/UL (ref 150–450)
PO2 BLDCOA: 56 MM HG (ref 5–31)
PO2 BLDCOV: 25 MM HG (ref 13–37)
POTASSIUM SERPL-SCNC: 4 MMOL/L (ref 3.5–5.3)
POTASSIUM SERPL-SCNC: 4 MMOL/L (ref 3.5–5.3)
PROT SERPL-MCNC: 5.4 G/DL (ref 6.4–8.2)
PROT SERPL-MCNC: 5.8 G/DL (ref 6.4–8.2)
PROT UR-ACNC: <4 MG/DL (ref 5–24)
PROT/CREAT UR: ABNORMAL MG/G{CREAT}
RBC # BLD AUTO: 3.1 X10*6/UL (ref 4–5.2)
RBC # BLD AUTO: 3.11 X10*6/UL (ref 4–5.2)
SAO2 % BLDCOA: 91 % (ref 3–69)
SAO2 % BLDCOV: 46 % (ref 16–84)
SODIUM SERPL-SCNC: 133 MMOL/L (ref 136–145)
SODIUM SERPL-SCNC: 134 MMOL/L (ref 136–145)
WBC # BLD AUTO: 16.5 X10*3/UL (ref 4.4–11.3)
WBC # BLD AUTO: 19.9 X10*3/UL (ref 4.4–11.3)

## 2025-06-07 PROCEDURE — 82805 BLOOD GASES W/O2 SATURATION: CPT | Performed by: OBSTETRICS & GYNECOLOGY

## 2025-06-07 PROCEDURE — 2720000007 HC OR 272 NO HCPCS: Performed by: OBSTETRICS & GYNECOLOGY

## 2025-06-07 PROCEDURE — A6213 FOAM DRG >16<=48 SQ IN W/BDR: HCPCS | Performed by: OBSTETRICS & GYNECOLOGY

## 2025-06-07 PROCEDURE — 2500000004 HC RX 250 GENERAL PHARMACY W/ HCPCS (ALT 636 FOR OP/ED): Mod: JZ | Performed by: OBSTETRICS & GYNECOLOGY

## 2025-06-07 PROCEDURE — 88307 TISSUE EXAM BY PATHOLOGIST: CPT | Performed by: PATHOLOGY

## 2025-06-07 PROCEDURE — 1100000001 HC PRIVATE ROOM DAILY

## 2025-06-07 PROCEDURE — 3E0P7VZ INTRODUCTION OF HORMONE INTO FEMALE REPRODUCTIVE, VIA NATURAL OR ARTIFICIAL OPENING: ICD-10-PCS | Performed by: OBSTETRICS & GYNECOLOGY

## 2025-06-07 PROCEDURE — 88307 TISSUE EXAM BY PATHOLOGIST: CPT | Mod: TC,AHULAB | Performed by: OBSTETRICS & GYNECOLOGY

## 2025-06-07 PROCEDURE — 82570 ASSAY OF URINE CREATININE: CPT | Performed by: NURSE PRACTITIONER

## 2025-06-07 PROCEDURE — 7100000016 HC LABOR RECOVERY PER HOUR

## 2025-06-07 PROCEDURE — 59514 CESAREAN DELIVERY ONLY: CPT | Performed by: OBSTETRICS & GYNECOLOGY

## 2025-06-07 PROCEDURE — 2500000001 HC RX 250 WO HCPCS SELF ADMINISTERED DRUGS (ALT 637 FOR MEDICARE OP): Performed by: OBSTETRICS & GYNECOLOGY

## 2025-06-07 PROCEDURE — 0U7C7DJ DILATION OF CERVIX WITH INTRALUM DEV, TEMP, VIA OPENING: ICD-10-PCS | Performed by: OBSTETRICS & GYNECOLOGY

## 2025-06-07 PROCEDURE — 36415 COLL VENOUS BLD VENIPUNCTURE: CPT | Performed by: NURSE PRACTITIONER

## 2025-06-07 PROCEDURE — 3700000014 HC AN EPIDURAL BLOCK CHARGE: Performed by: OBSTETRICS & GYNECOLOGY

## 2025-06-07 PROCEDURE — 7210000002 HC LABOR PER HOUR

## 2025-06-07 PROCEDURE — 7100000016 HC LABOR RECOVERY PER HOUR: Performed by: OBSTETRICS & GYNECOLOGY

## 2025-06-07 PROCEDURE — 2500000004 HC RX 250 GENERAL PHARMACY W/ HCPCS (ALT 636 FOR OP/ED): Performed by: OBSTETRICS & GYNECOLOGY

## 2025-06-07 PROCEDURE — 80053 COMPREHEN METABOLIC PANEL: CPT | Performed by: NURSE PRACTITIONER

## 2025-06-07 PROCEDURE — 85027 COMPLETE CBC AUTOMATED: CPT | Performed by: NURSE PRACTITIONER

## 2025-06-07 PROCEDURE — 59510 CESAREAN DELIVERY: CPT | Performed by: OBSTETRICS & GYNECOLOGY

## 2025-06-07 PROCEDURE — 2500000004 HC RX 250 GENERAL PHARMACY W/ HCPCS (ALT 636 FOR OP/ED): Mod: JZ | Performed by: NURSE ANESTHETIST, CERTIFIED REGISTERED

## 2025-06-07 PROCEDURE — 3E033VJ INTRODUCTION OF OTHER HORMONE INTO PERIPHERAL VEIN, PERCUTANEOUS APPROACH: ICD-10-PCS | Performed by: OBSTETRICS & GYNECOLOGY

## 2025-06-07 PROCEDURE — 2500000004 HC RX 250 GENERAL PHARMACY W/ HCPCS (ALT 636 FOR OP/ED): Mod: JW | Performed by: NURSE ANESTHETIST, CERTIFIED REGISTERED

## 2025-06-07 RX ORDER — LIDOCAINE HCL/EPINEPHRINE/PF 2%-1:200K
VIAL (ML) INJECTION AS NEEDED
Status: DISCONTINUED | OUTPATIENT
Start: 2025-06-07 | End: 2025-06-07

## 2025-06-07 RX ORDER — CARBOPROST TROMETHAMINE 250 UG/ML
250 INJECTION, SOLUTION INTRAMUSCULAR ONCE AS NEEDED
Status: DISCONTINUED | OUTPATIENT
Start: 2025-06-07 | End: 2025-06-08 | Stop reason: HOSPADM

## 2025-06-07 RX ORDER — DIPHENHYDRAMINE HYDROCHLORIDE 50 MG/ML
25 INJECTION, SOLUTION INTRAMUSCULAR; INTRAVENOUS EVERY 4 HOURS PRN
Status: DISCONTINUED | OUTPATIENT
Start: 2025-06-07 | End: 2025-06-08 | Stop reason: HOSPADM

## 2025-06-07 RX ORDER — OXYCODONE HYDROCHLORIDE 5 MG/1
5 TABLET ORAL EVERY 4 HOURS PRN
Status: DISCONTINUED | OUTPATIENT
Start: 2025-06-08 | End: 2025-06-08 | Stop reason: HOSPADM

## 2025-06-07 RX ORDER — LOPERAMIDE HYDROCHLORIDE 2 MG/1
4 CAPSULE ORAL EVERY 2 HOUR PRN
Status: DISCONTINUED | OUTPATIENT
Start: 2025-06-07 | End: 2025-06-08 | Stop reason: HOSPADM

## 2025-06-07 RX ORDER — TRANEXAMIC ACID 1 G/10ML
1000 INJECTION, SOLUTION INTRAVENOUS ONCE AS NEEDED
Status: DISCONTINUED | OUTPATIENT
Start: 2025-06-07 | End: 2025-06-08 | Stop reason: HOSPADM

## 2025-06-07 RX ORDER — KETOROLAC TROMETHAMINE 30 MG/ML
30 INJECTION, SOLUTION INTRAMUSCULAR; INTRAVENOUS EVERY 6 HOURS
Status: COMPLETED | OUTPATIENT
Start: 2025-06-07 | End: 2025-06-07

## 2025-06-07 RX ORDER — OXYTOCIN/0.9 % SODIUM CHLORIDE 30/500 ML
60 PLASTIC BAG, INJECTION (ML) INTRAVENOUS ONCE AS NEEDED
Status: DISCONTINUED | OUTPATIENT
Start: 2025-06-07 | End: 2025-06-08 | Stop reason: HOSPADM

## 2025-06-07 RX ORDER — OXYTOCIN 10 [USP'U]/ML
10 INJECTION, SOLUTION INTRAMUSCULAR; INTRAVENOUS ONCE AS NEEDED
Status: DISCONTINUED | OUTPATIENT
Start: 2025-06-07 | End: 2025-06-08 | Stop reason: HOSPADM

## 2025-06-07 RX ORDER — LABETALOL HYDROCHLORIDE 5 MG/ML
20 INJECTION, SOLUTION INTRAVENOUS ONCE AS NEEDED
Status: DISCONTINUED | OUTPATIENT
Start: 2025-06-07 | End: 2025-06-08 | Stop reason: HOSPADM

## 2025-06-07 RX ORDER — MISOPROSTOL 200 UG/1
800 TABLET ORAL ONCE AS NEEDED
Status: DISCONTINUED | OUTPATIENT
Start: 2025-06-07 | End: 2025-06-08 | Stop reason: HOSPADM

## 2025-06-07 RX ORDER — HYDRALAZINE HYDROCHLORIDE 20 MG/ML
5 INJECTION INTRAMUSCULAR; INTRAVENOUS ONCE AS NEEDED
Status: DISCONTINUED | OUTPATIENT
Start: 2025-06-07 | End: 2025-06-08 | Stop reason: HOSPADM

## 2025-06-07 RX ORDER — PHENYLEPHRINE HCL IN 0.9% NACL 0.4MG/10ML
SYRINGE (ML) INTRAVENOUS AS NEEDED
Status: DISCONTINUED | OUTPATIENT
Start: 2025-06-07 | End: 2025-06-07

## 2025-06-07 RX ORDER — SIMETHICONE 80 MG
80 TABLET,CHEWABLE ORAL 4 TIMES DAILY PRN
Status: DISCONTINUED | OUTPATIENT
Start: 2025-06-07 | End: 2025-06-08 | Stop reason: HOSPADM

## 2025-06-07 RX ORDER — AZITHROMYCIN MONOHYDRATE 500 MG/5ML
INJECTION, POWDER, LYOPHILIZED, FOR SOLUTION INTRAVENOUS AS NEEDED
Status: DISCONTINUED | OUTPATIENT
Start: 2025-06-07 | End: 2025-06-07

## 2025-06-07 RX ORDER — POLYETHYLENE GLYCOL 3350 17 G/17G
17 POWDER, FOR SOLUTION ORAL 2 TIMES DAILY PRN
Status: DISCONTINUED | OUTPATIENT
Start: 2025-06-07 | End: 2025-06-08 | Stop reason: HOSPADM

## 2025-06-07 RX ORDER — ADHESIVE BANDAGE
10 BANDAGE TOPICAL
Status: DISCONTINUED | OUTPATIENT
Start: 2025-06-07 | End: 2025-06-08 | Stop reason: HOSPADM

## 2025-06-07 RX ORDER — ONDANSETRON HYDROCHLORIDE 2 MG/ML
4 INJECTION, SOLUTION INTRAVENOUS EVERY 6 HOURS PRN
Status: DISCONTINUED | OUTPATIENT
Start: 2025-06-07 | End: 2025-06-08 | Stop reason: HOSPADM

## 2025-06-07 RX ORDER — DIPHENHYDRAMINE HCL 25 MG
25 CAPSULE ORAL EVERY 4 HOURS PRN
Status: DISCONTINUED | OUTPATIENT
Start: 2025-06-07 | End: 2025-06-08 | Stop reason: HOSPADM

## 2025-06-07 RX ORDER — MORPHINE SULFATE 0.5 MG/ML
INJECTION, SOLUTION EPIDURAL; INTRATHECAL; INTRAVENOUS AS NEEDED
Status: DISCONTINUED | OUTPATIENT
Start: 2025-06-07 | End: 2025-06-07

## 2025-06-07 RX ORDER — IBUPROFEN 600 MG/1
600 TABLET, FILM COATED ORAL EVERY 6 HOURS
Status: DISCONTINUED | OUTPATIENT
Start: 2025-06-08 | End: 2025-06-08 | Stop reason: HOSPADM

## 2025-06-07 RX ORDER — HYDROMORPHONE HYDROCHLORIDE 1 MG/ML
0.2 INJECTION, SOLUTION INTRAMUSCULAR; INTRAVENOUS; SUBCUTANEOUS EVERY 5 MIN PRN
Status: DISCONTINUED | OUTPATIENT
Start: 2025-06-07 | End: 2025-06-08 | Stop reason: HOSPADM

## 2025-06-07 RX ORDER — HYDROMORPHONE HYDROCHLORIDE 1 MG/ML
0.2 INJECTION, SOLUTION INTRAMUSCULAR; INTRAVENOUS; SUBCUTANEOUS EVERY 5 MIN PRN
OUTPATIENT
Start: 2025-06-07

## 2025-06-07 RX ORDER — DEXMEDETOMIDINE IN 0.9 % NACL 20 MCG/5ML
SYRINGE (ML) INTRAVENOUS AS NEEDED
Status: DISCONTINUED | OUTPATIENT
Start: 2025-06-07 | End: 2025-06-07

## 2025-06-07 RX ORDER — LIDOCAINE 560 MG/1
1 PATCH PERCUTANEOUS; TOPICAL; TRANSDERMAL
Status: DISCONTINUED | OUTPATIENT
Start: 2025-06-07 | End: 2025-06-08 | Stop reason: HOSPADM

## 2025-06-07 RX ORDER — METHYLERGONOVINE MALEATE 0.2 MG/ML
0.2 INJECTION INTRAVENOUS ONCE AS NEEDED
Status: DISCONTINUED | OUTPATIENT
Start: 2025-06-07 | End: 2025-06-08 | Stop reason: HOSPADM

## 2025-06-07 RX ORDER — OXYCODONE HYDROCHLORIDE 5 MG/1
10 TABLET ORAL EVERY 4 HOURS PRN
Status: DISCONTINUED | OUTPATIENT
Start: 2025-06-08 | End: 2025-06-08 | Stop reason: HOSPADM

## 2025-06-07 RX ORDER — ACETAMINOPHEN 325 MG/1
975 TABLET ORAL EVERY 6 HOURS
Status: DISCONTINUED | OUTPATIENT
Start: 2025-06-07 | End: 2025-06-08 | Stop reason: HOSPADM

## 2025-06-07 RX ORDER — NALOXONE HYDROCHLORIDE 0.4 MG/ML
0.1 INJECTION, SOLUTION INTRAMUSCULAR; INTRAVENOUS; SUBCUTANEOUS EVERY 5 MIN PRN
Status: DISCONTINUED | OUTPATIENT
Start: 2025-06-07 | End: 2025-06-08 | Stop reason: HOSPADM

## 2025-06-07 RX ORDER — ONDANSETRON 4 MG/1
4 TABLET, FILM COATED ORAL EVERY 6 HOURS PRN
Status: DISCONTINUED | OUTPATIENT
Start: 2025-06-07 | End: 2025-06-08 | Stop reason: HOSPADM

## 2025-06-07 RX ORDER — CEFAZOLIN 1 G/1
INJECTION, POWDER, FOR SOLUTION INTRAVENOUS AS NEEDED
Status: DISCONTINUED | OUTPATIENT
Start: 2025-06-07 | End: 2025-06-07

## 2025-06-07 RX ORDER — KETOROLAC TROMETHAMINE 30 MG/ML
INJECTION, SOLUTION INTRAMUSCULAR; INTRAVENOUS AS NEEDED
Status: DISCONTINUED | OUTPATIENT
Start: 2025-06-07 | End: 2025-06-07

## 2025-06-07 RX ADMIN — MORPHINE SULFATE 2 MG: 0.5 INJECTION, SOLUTION EPIDURAL; INTRATHECAL; INTRAVENOUS at 01:38

## 2025-06-07 RX ADMIN — KETOROLAC TROMETHAMINE 30 MG: 30 INJECTION, SOLUTION INTRAMUSCULAR; INTRAVENOUS at 07:36

## 2025-06-07 RX ADMIN — OXYTOCIN 600 MILLI-UNITS/MIN: 10 INJECTION, SOLUTION INTRAMUSCULAR; INTRAVENOUS at 01:33

## 2025-06-07 RX ADMIN — Medication 40 MCG: at 01:21

## 2025-06-07 RX ADMIN — KETOROLAC TROMETHAMINE 30 MG: 30 INJECTION, SOLUTION INTRAMUSCULAR; INTRAVENOUS at 13:43

## 2025-06-07 RX ADMIN — LIDOCAINE HYDROCHLORIDE,EPINEPHRINE BITARTRATE 5 ML: 20; .005 INJECTION, SOLUTION EPIDURAL; INFILTRATION; INTRACAUDAL; PERINEURAL at 01:12

## 2025-06-07 RX ADMIN — SODIUM CHLORIDE, POTASSIUM CHLORIDE, SODIUM LACTATE AND CALCIUM CHLORIDE: 600; 310; 30; 20 INJECTION, SOLUTION INTRAVENOUS at 01:00

## 2025-06-07 RX ADMIN — KETOROLAC TROMETHAMINE 30 MG: 30 INJECTION, SOLUTION INTRAMUSCULAR; INTRAVENOUS at 01:36

## 2025-06-07 RX ADMIN — AZITHROMYCIN MONOHYDRATE 500 MG: 500 INJECTION, POWDER, LYOPHILIZED, FOR SOLUTION INTRAVENOUS at 01:17

## 2025-06-07 RX ADMIN — LIDOCAINE HYDROCHLORIDE,EPINEPHRINE BITARTRATE 5 ML: 20; .005 INJECTION, SOLUTION EPIDURAL; INFILTRATION; INTRACAUDAL; PERINEURAL at 01:07

## 2025-06-07 RX ADMIN — SODIUM CHLORIDE, POTASSIUM CHLORIDE, SODIUM LACTATE AND CALCIUM CHLORIDE: 600; 310; 30; 20 INJECTION, SOLUTION INTRAVENOUS at 01:44

## 2025-06-07 RX ADMIN — Medication 8 MCG: at 01:22

## 2025-06-07 RX ADMIN — ACETAMINOPHEN 975 MG: 325 TABLET ORAL at 07:37

## 2025-06-07 RX ADMIN — ONDANSETRON 4 MG: 2 INJECTION INTRAMUSCULAR; INTRAVENOUS at 01:24

## 2025-06-07 RX ADMIN — ONDANSETRON 4 MG: 2 INJECTION INTRAMUSCULAR; INTRAVENOUS at 08:34

## 2025-06-07 RX ADMIN — DEXAMETHASONE SODIUM PHOSPHATE 8 MG: 4 INJECTION, SOLUTION INTRA-ARTICULAR; INTRALESIONAL; INTRAMUSCULAR; INTRAVENOUS; SOFT TISSUE at 01:25

## 2025-06-07 RX ADMIN — CEFAZOLIN 2 G: 330 INJECTION, POWDER, FOR SOLUTION INTRAMUSCULAR; INTRAVENOUS at 01:17

## 2025-06-07 RX ADMIN — KETOROLAC TROMETHAMINE 30 MG: 30 INJECTION, SOLUTION INTRAMUSCULAR; INTRAVENOUS at 20:21

## 2025-06-07 ASSESSMENT — PAIN SCALES - GENERAL
PAIN_LEVEL: 0
PAINLEVEL_OUTOF10: 0 - NO PAIN
PAINLEVEL_OUTOF10: 5 - MODERATE PAIN
PAINLEVEL_OUTOF10: 4

## 2025-06-07 ASSESSMENT — PAIN DESCRIPTION - DESCRIPTORS: DESCRIPTORS: CRAMPING

## 2025-06-07 NOTE — PROGRESS NOTES
S: Pt resting after epidural      O:  minimal variability with late decelerations  SVE: 5/80/-2, unchanged from previous exam       A: Term IOL for FGR   Cat II EFM   GBS negative   Latent labor  gHTN, recently meeting criteria      P:   CEFM   VS per protocol  Dr. Mooney aware for fetal heart rate changes  HELLP labs obtained  I discussed concerns with the patient  Brown placed and continue to position change for fetal heart rate   Continue close surveillance      Keturah Monzon, APRN-CNM, APRN-CNP

## 2025-06-07 NOTE — CARE PLAN
The patient's goals for the shift include mother and baby safety    The clinical goals for the shift include rest and bond with baby

## 2025-06-07 NOTE — PROGRESS NOTES
S: Pt struggling with the pain of labor. Requesting and epidural, agreeable to SVE    O:  minimal variability   SVE: 5/80/-2  AROM for clear fluid, copious amount    A: Term IOL for FGR   Cat II EFM   GBS negative   Latent labor    P:   CEFM   VS per protocol  Epidural per pt request     Keturah Monzon, APRN-CNM, APRN-CNP

## 2025-06-07 NOTE — ANESTHESIA POSTPROCEDURE EVALUATION
Patient: Ifeanyi Lowery    Procedure Summary       Date: 25 Room / Location: [facility]    Anesthesia Start:  Anesthesia Stop: 25    Procedure:  DELIVERY Diagnosis: (Fetal Intolerance of Labor)    Surgeons: Izaiah Mooney MD Responsible Provider: FLORINA Glez    Anesthesia Type: epidural ASA Status: 2            Anesthesia Type: epidural    Vitals Value Taken Time   /56 25 02:31   Temp 36.2 25 02:31   Pulse 75 25 02:31   Resp 18 25 02:31   SpO2 99 25 02:31       Anesthesia Post Evaluation    Patient location during evaluation: bedside  Patient participation: complete - patient participated  Level of consciousness: awake and alert  Pain score: 0  Pain management: adequate  Airway patency: patent  Cardiovascular status: acceptable  Respiratory status: acceptable  Hydration status: acceptable  Postoperative Nausea and Vomiting: none        There were no known notable events for this encounter.

## 2025-06-07 NOTE — ANESTHESIA PREPROCEDURE EVALUATION
Patient: Ifeanyi Lowery    Evaluation Method: In-person visit    Procedure Information    Date: 06/05/25  Procedure: Labor Consult         Relevant Problems   Hematology   (+) Anemia   (+) Antepartum anemia      GYN   (+) 36 weeks gestation of pregnancy ([facility])       Clinical information reviewed:   Tobacco  Allergies  Meds   Med Hx  Surg Hx   Fam Hx  Soc Hx        NPO Detail:  No data recorded     OB/Gyn Evaluation    Present Pregnancy    Patient is pregnant now.   Obstetric History                Physical Exam    Airway  Mallampati: I  TM distance: >3 FB  Neck ROM: full  Mouth opening: 3 or more finger widths     Cardiovascular - normal exam  Rhythm: regular     Dental - normal exam     Pulmonary - normal examBreath sounds clear to auscultation     Abdominal Abdomen: soft             Anesthesia Plan    History of general anesthesia?: no  History of complications of general anesthesia?: no    ASA 2     epidural     The patient is not a current smoker.  Patient was not previously instructed to abstain from smoking on day of procedure.  Patient did not smoke on day of procedure.    Anesthetic plan and risks discussed with patient.

## 2025-06-07 NOTE — L&D DELIVERY NOTE
Birth Operative Report    Patient Name: Ifeanyi Lowery  : 2000  MRN: [id number]  Age: 24 y.o.    /Para:   Gestational Age: 37w2d    Date of Surgery: 2025    Operating Room Location: * No operating room entered *    Pre-op Diagnosis:  Intrauterine Pregnancy at 37w2d  Fetal growth restriction with rising Dopplers  Fetal intolerance of labor.  Persistent category 2 tracing    Post-op Diagnosis:  Same    Procedure:   Repeat Low Transverse  Section and Primary Low Transverse  Section    Surgery Category at Huddle Time: Confirmed Urgent    Surgeon:    * Izaiah Mooney - Primary    Resident/Fellow/Other Assistant: Michael  Surgeons and Role:  * No surgeons found with a matching role *    Anesthesia:  Type: epidural     CRNA: ANDREA Glez-CRNA    Surgical Staff:  No surgical staff documented.     Preoperative Antibiotics: Ancef 2 g and Azithromycin 500 mg    Indication for Procedure:   24 y.o.  at 37w2d fetal intolerance of labor persistent category 2 tracing remote from delivery    Informed Consent:  The risks, benefits, complications, and alternatives were discussed with the patient. The patient understood that the risks of  section include but are not limited to infection, bleeding, injury to nearby structures or organs, possible need for transfusion, and potential need for more surgery. The patient stated understanding and desired to proceed. All questions were answered. The site of surgery was properly noted and marked. The patient's identity was confirmed, and the procedure verified as a  delivery. A Time Out was held and the above information confirmed.     Findings: Live male infant clear fluid  Normal appearing gravid uterus, fallopian tubes, and ovaries. Amniotic fluid Clear, Male infant in Vertex Occiput Transverse presentation, APGARS  ,  .  Birth Weight  .    Description of Procedure:   Patient was taken to the OR where regional  anesthesia was found to be adequate.  She was then placed in the dorsal supine position with a left lateral tilt. A whitlock catheter was placed, SCDs were applied, and a vaginal prep was performed. A pre-procedure time out was performed. The patient was given a prophylactic dose of IV antibiotics.  She was then prepped and draped in the usual sterile fashion.     A Pfannenstiel skin incision was made with the scalpel through the skin and subcutaneous fat to the underlying fascial layer. The fascia was incised in the midline with the scalpel and the incision was extended bilaterally. The superior aspect of the incision was grasped, tented up with Kocher clamps and the rectus muscle was dissected off. The muscles were divided in the midline, the peritoneum was then identified and entered bluntly and incision extended superiorly and inferiorly taking care to avoid underlying viscera.  The bladder blade was inserted.       Uterine Incision was made with the scalpel, the uterine cavity was entered, and the hysterotomy was extended cephalocaudally by stretching. The infant was delivered atraumatically, the cord was clamped and cut and infant was handed off to awaiting nursing.  A cord segment and blood sample were collected. The placenta was then expressed. The uterus was cleared of all clot and debris. The uterine incision was repaired using a running locked stitch of #1 Monocryl in one layer. Adequate hemostasis was noted.    The hysterotomy was again evaluated and found to be hemostatic, the underside of the fascia and bladder and the rectus muscles were also found to be hemostatic.  Parietal peritoneum closed with 2-0 Vicryl suture running the fascia was closed using a running stitch of 0 Vicryl suture starting at both angles being tied in the midline.  The subcutaneous layer was irrigated, small bleeders were cauterized. The subcutaneous layer was re-approximated using a running stitch of 3-0 Monocryl. The skin was  closed with 4-0 Vicryl excuse me.         * Izaiah Pederson was present for key portions of the procedure.    Additional Procedures:      Task Performed by: RN or PA Surgical Assistant: Exposure retraction and assistance necessary to the procedure    Complications:      Quantitative Blood Loss:   Delivery Blood Loss: 0 mL (2025  1:26 AM - 2025  1:56 AM)    Blood products: None  Blood Product Administration History       None            Uterotonics/Hemostatic Agent: IV Pitocin 30 units    Specimen:   Placenta  Delivered:    Appearance: Intact  Removal: Spontaneous    Disposition: lab    Sponge/Instrument/Needle Counts: The sponge, lap and needle counts were correct.    Patient Disposition: Patient recovering on labor and delivery in stable condition.    Kyle Lowery [[id number]]      Labor Events    Rupture date/time: 2025 1240  Rupture type: Spontaneous  Fluid color: Clear  Fluid odor: None  Labor type: Induced Onset of Labor  Labor allowed to proceed with plans for an attempted vaginal birth?: Yes  Induction: Oxytocin  Induction indications: Other  Complications: Fetal Intolerance       Placenta    Placenta delivery date/time:   Placenta removal: Spontaneous  Placenta appearance: Intact  Placenta disposition: lab       Cord    Vessels: 3 vessels  Complications: None  Cord blood disposition: Lab  Gases sent?: Yes  Stem cell collection (by provider): No       Lacerations    Episiotomy: None  Other lacerations?: No  Repair suture: None       Anesthesia    Method: Epidural       Operative Delivery    Forceps attempted?: No  Vacuum extractor attempted?: No       Shoulder Dystocia    Shoulder dystocia present?: No        Delivery    Birth date/time: 2025 01:30:00  Delivery type: , Low Transverse   categorization: primary   priority: urgent  Indications for : Fetal Intolerance of Labor, Persistent Category 2 Tracing Remote from Delivery  Complications:  Fetal Intolerance       Apgars    Living status:   Apgar Component Scores:  1 min.:  5 min.:  10 min.:  15 min.:  20 min.:    Skin color:         Heart rate:         Reflex irritability:         Muscle tone:         Respiratory effort:         Total:                Delivery Providers    Delivering clinician:    Provider Role     Delivery Nurse     Nursery Nurse     Resident

## 2025-06-07 NOTE — PROGRESS NOTES
S: Pt coping well with nitrous, beside to assess pt status and fetal status.     O:  minimal variability   SVE: 4-5/80/BB and -2  AROM for clear fluid, copious amount    A: Term IOL for FGR   Cat II EFM   GBS negative     P: pitocin off   CEFM   VS per protocol  Continue to monitor closely for progress in the presence of Cat II  Dr. Mooney aware of the EFM and maternal status    Keturah Monzon, APRN-CNM, APRN-CNP

## 2025-06-07 NOTE — SIGNIFICANT EVENT
Reviewed fetal heart tracing with patient.  Minimal [id number] variability.  Occasional late decelerations.  Inadequate contractions with Pitocin off.  Category 2 tracing remote from delivery.  Discussed options risks and benefits and will proceed with a primary low-transverse  section

## 2025-06-07 NOTE — LACTATION NOTE
Lactation Consultant Note  Lactation Consultation  Reason for Consult: Initial assessment  Consultant Name: SANJIV Carcamo    Maternal Information  Has mother  before?: No  Infant to breast within first 2 hours of birth?: Yes  Exclusive Pump and Bottle Feed: No    Maternal Assessment  Breast Assessment: Medium, Soft, Compressible  Nipple Assessment: Intact, Erect, Inverted centrally  [place] Assessment: Normal    Infant Assessment  Infant Behavior: Sleepy  Infant Assessment: Good cupping of tongue    Feeding Assessment  Nutrition Source: Breastmilk  Feeding Method: Nursing at the breast  Feeding Position: C - hold, Cross - cradle  Latch Assessment: No latch achieved    LATCH TOOL       Breast Pump       Other OB Lactation Tools       Patient Follow-up       Other OB Lactation Documentation       Recommendations/Summary  In for initial visit with mom. At time of visit infant 8 hours of life. Infant in skin to skin. Infant very sleepy. Attempted to wake infant for feed unsuccessful. Discussed hand expression with mom and skill demonstrated with assistance and permission. A few drops obtained and used to try to entice infant for feed. Attempt unsuccessful. Discussed with mom normal  behaviors and expected output the first week of life. Encouraged skin to skin and discussed normal feeding cues referenced postpartum book for visuals. Encouraged mom to call for assistance. Discussed ways to try to wake infant for feeds. Discussed with mom if infant is still very sleepy and not latching more frequently closer to 12 hours old we will set her up to use a breast pump to help encourage milk supply. Name written on board and discussed how to call for lactation assistance.   -1300 set up mom to pump infant had large emesis and still not interested in feeding. Infant has been in skin to skin. Blood sugars remain stable.   -1511 Mom states she had a few drops of colostrum she used her finger to put in infants mouth. Mom  states infant then showed hunger cues and she latched infant for 30 mins. No latch seen but mom states latched well.

## 2025-06-07 NOTE — CARE PLAN
Problem: Vaginal Birth or  Section  Goal: Fetal and maternal status remain reassuring during the birth process  Outcome: Progressing  Goal: Tolerate CRB for IOL placement maintenance until dislodgement/removal 12hrs after placement  Outcome: Progressing  Goal: Prevention of malpresentation/labor dystocia through delivery  Outcome: Progressing  Goal: Demonstrates labor coping techniques through delivery  Outcome: Progressing  Goal: Minimal s/sx of HDP and BP<160/110  Outcome: Progressing  Goal: No s/sx of infection through recovery  Outcome: Progressing  Goal: No s/sx of hemorrhage through recovery  Outcome: Progressing     The patient's goals for the shift include mother and baby safety    The clinical goals for the shift include VSS, assessments WNL, tolerate induction of labor.     Over the shift, the patient did make progress toward the following goals.

## 2025-06-07 NOTE — ANESTHESIA PROCEDURE NOTES
Epidural Block    Patient location during procedure: OB  Start time: 6/6/2025 10:48 PM  End time: 6/6/2025 11:05 PM  Reason for block: labor analgesia  Staffing  Performed: CRNA   Authorized by: FLORINA Glez    Performed by: FLORINA Glez    Preanesthetic Checklist  Completed: patient identified, IV checked, risks and benefits discussed, surgical consent, pre-op evaluation, timeout performed and sterile techniques followed  Block Timeout  RN/Licensed healthcare professional reads aloud to the Anesthesia provider and entire team: Patient identity, procedure with side and site, patient position, and as applicable the availability of implants/special equipment/special requirements.    Timeout performed at: 6/6/2025 10:48 PM  Block Placement  Patient position: sitting  Prep: ChloraPrep  Sterility prep: cap, drape, gloves, hand and mask  Sedation level: no sedation  Patient monitoring: blood pressure, continuous pulse oximetry and heart rate  Approach: midline  Local numbing: lidocaine 1% to skin and subcutaneous tissues  Vertebral space: lumbar  Lumbar location: L2-L3  Epidural  Loss of resistance technique: saline  Guidance: landmark technique        Needle  Needle type: Tuohy   Needle gauge: 17  Needle length: 9 cm  Needle insertion depth: 6.5 cm  Catheter type: end hole  Catheter size: 19 G  Catheter at skin depth: 12 cm  Catheter securement method: clear occlusive dressing    Test dose: lidocaine 1.5% with epinephrine 1-to-200,000  Test dose: lidocaine 1.5% with epinephrine 1-to-200,000  Test dose result: no positive test dose    PCEA  Medication concentration used: 0.2% Ropivacaine with 2 mcg/mL Fentanyl  Dose (mL): 5  Lockout (minutes): 15  1-Hour Limit (boluses/hr): 25  Basal Rate: 8        Assessment  Sensory level: T6 bilateral  Block outcome: patient comfortable  Number of attempts: 2  Events: no positive test dose  Procedure assessment: patient tolerated procedure well with no  immediate complications

## 2025-06-08 ENCOUNTER — PATIENT MESSAGE (OUTPATIENT)
Dept: OBSTETRICS AND GYNECOLOGY | Facility: CLINIC | Age: 25
End: 2025-06-08
Payer: COMMERCIAL

## 2025-06-08 ENCOUNTER — HOSPITAL ENCOUNTER (INPATIENT)
Facility: HOSPITAL | Age: 25
End: 2025-06-08
Attending: OBSTETRICS & GYNECOLOGY | Admitting: OBSTETRICS & GYNECOLOGY
Payer: COMMERCIAL

## 2025-06-08 ENCOUNTER — HOSPITAL ENCOUNTER (OUTPATIENT)
Facility: HOSPITAL | Age: 25
Setting detail: OBSERVATION
LOS: 1 days | Discharge: HOME | End: 2025-06-10
Attending: OBSTETRICS & GYNECOLOGY | Admitting: OBSTETRICS & GYNECOLOGY
Payer: COMMERCIAL

## 2025-06-08 VITALS
TEMPERATURE: 98.6 F | SYSTOLIC BLOOD PRESSURE: 118 MMHG | BODY MASS INDEX: 29.66 KG/M2 | HEIGHT: 61 IN | RESPIRATION RATE: 18 BRPM | HEART RATE: 81 BPM | OXYGEN SATURATION: 97 % | DIASTOLIC BLOOD PRESSURE: 69 MMHG | WEIGHT: 157.08 LBS

## 2025-06-08 PROBLEM — D62 ACUTE ON CHRONIC BLOOD LOSS ANEMIA: Status: ACTIVE | Noted: 2023-06-12

## 2025-06-08 PROBLEM — O13.9 GESTATIONAL HYPERTENSION (HHS-HCC): Status: ACTIVE | Noted: 2024-11-12

## 2025-06-08 LAB
ERYTHROCYTE [DISTWIDTH] IN BLOOD BY AUTOMATED COUNT: 14.3 % (ref 11.5–14.5)
HCT VFR BLD AUTO: 26.9 % (ref 36–46)
HGB BLD-MCNC: 9 G/DL (ref 12–16)
MCH RBC QN AUTO: 31.8 PG (ref 26–34)
MCHC RBC AUTO-ENTMCNC: 33.5 G/DL (ref 32–36)
MCV RBC AUTO: 95 FL (ref 80–100)
NRBC BLD-RTO: 0 /100 WBCS (ref 0–0)
PLATELET # BLD AUTO: 232 X10*3/UL (ref 150–450)
RBC # BLD AUTO: 2.83 X10*6/UL (ref 4–5.2)
WBC # BLD AUTO: 18.4 X10*3/UL (ref 4.4–11.3)

## 2025-06-08 PROCEDURE — 1210000001 HC SEMI-PRIVATE ROOM DAILY

## 2025-06-08 PROCEDURE — 2500000004 HC RX 250 GENERAL PHARMACY W/ HCPCS (ALT 636 FOR OP/ED): Performed by: STUDENT IN AN ORGANIZED HEALTH CARE EDUCATION/TRAINING PROGRAM

## 2025-06-08 PROCEDURE — 36415 COLL VENOUS BLD VENIPUNCTURE: CPT | Performed by: OBSTETRICS & GYNECOLOGY

## 2025-06-08 PROCEDURE — 2500000001 HC RX 250 WO HCPCS SELF ADMINISTERED DRUGS (ALT 637 FOR MEDICARE OP): Performed by: OBSTETRICS & GYNECOLOGY

## 2025-06-08 PROCEDURE — 85027 COMPLETE CBC AUTOMATED: CPT | Performed by: OBSTETRICS & GYNECOLOGY

## 2025-06-08 PROCEDURE — 2500000005 HC RX 250 GENERAL PHARMACY W/O HCPCS: Performed by: STUDENT IN AN ORGANIZED HEALTH CARE EDUCATION/TRAINING PROGRAM

## 2025-06-08 PROCEDURE — 2500000001 HC RX 250 WO HCPCS SELF ADMINISTERED DRUGS (ALT 637 FOR MEDICARE OP): Performed by: STUDENT IN AN ORGANIZED HEALTH CARE EDUCATION/TRAINING PROGRAM

## 2025-06-08 RX ORDER — OXYTOCIN 10 [USP'U]/ML
10 INJECTION, SOLUTION INTRAMUSCULAR; INTRAVENOUS ONCE AS NEEDED
Status: DISCONTINUED | OUTPATIENT
Start: 2025-06-08 | End: 2025-06-10 | Stop reason: HOSPADM

## 2025-06-08 RX ORDER — HYDROMORPHONE HYDROCHLORIDE 0.2 MG/ML
0.2 INJECTION INTRAMUSCULAR; INTRAVENOUS; SUBCUTANEOUS EVERY 5 MIN PRN
Status: DISCONTINUED | OUTPATIENT
Start: 2025-06-08 | End: 2025-06-10 | Stop reason: HOSPADM

## 2025-06-08 RX ORDER — ONDANSETRON HYDROCHLORIDE 2 MG/ML
4 INJECTION, SOLUTION INTRAVENOUS EVERY 6 HOURS PRN
Status: DISCONTINUED | OUTPATIENT
Start: 2025-06-08 | End: 2025-06-10 | Stop reason: HOSPADM

## 2025-06-08 RX ORDER — SIMETHICONE 80 MG
80 TABLET,CHEWABLE ORAL 4 TIMES DAILY PRN
Status: DISCONTINUED | OUTPATIENT
Start: 2025-06-08 | End: 2025-06-10 | Stop reason: HOSPADM

## 2025-06-08 RX ORDER — ONDANSETRON 4 MG/1
4 TABLET, FILM COATED ORAL EVERY 6 HOURS PRN
Status: DISCONTINUED | OUTPATIENT
Start: 2025-06-08 | End: 2025-06-10 | Stop reason: HOSPADM

## 2025-06-08 RX ORDER — METHYLERGONOVINE MALEATE 0.2 MG/ML
0.2 INJECTION INTRAVENOUS ONCE AS NEEDED
Status: DISCONTINUED | OUTPATIENT
Start: 2025-06-08 | End: 2025-06-10 | Stop reason: HOSPADM

## 2025-06-08 RX ORDER — CARBOPROST TROMETHAMINE 250 UG/ML
250 INJECTION, SOLUTION INTRAMUSCULAR ONCE AS NEEDED
Status: DISCONTINUED | OUTPATIENT
Start: 2025-06-08 | End: 2025-06-10 | Stop reason: HOSPADM

## 2025-06-08 RX ORDER — LIDOCAINE 560 MG/1
1 PATCH PERCUTANEOUS; TOPICAL; TRANSDERMAL
Status: DISCONTINUED | OUTPATIENT
Start: 2025-06-08 | End: 2025-06-10 | Stop reason: HOSPADM

## 2025-06-08 RX ORDER — ADHESIVE BANDAGE
10 BANDAGE TOPICAL
Status: DISCONTINUED | OUTPATIENT
Start: 2025-06-08 | End: 2025-06-10 | Stop reason: HOSPADM

## 2025-06-08 RX ORDER — OXYCODONE HYDROCHLORIDE 5 MG/1
5 TABLET ORAL EVERY 4 HOURS PRN
Refills: 0 | Status: DISCONTINUED | OUTPATIENT
Start: 2025-06-08 | End: 2025-06-08

## 2025-06-08 RX ORDER — TRANEXAMIC ACID 1 G/10ML
1000 INJECTION, SOLUTION INTRAVENOUS ONCE AS NEEDED
Status: ACTIVE | OUTPATIENT
Start: 2025-06-08 | End: 2025-06-10

## 2025-06-08 RX ORDER — POLYETHYLENE GLYCOL 3350 17 G/17G
17 POWDER, FOR SOLUTION ORAL 2 TIMES DAILY PRN
Status: DISCONTINUED | OUTPATIENT
Start: 2025-06-08 | End: 2025-06-10 | Stop reason: HOSPADM

## 2025-06-08 RX ORDER — OXYCODONE HYDROCHLORIDE 5 MG/1
10 TABLET ORAL EVERY 4 HOURS PRN
Refills: 0 | Status: DISCONTINUED | OUTPATIENT
Start: 2025-06-08 | End: 2025-06-08

## 2025-06-08 RX ORDER — HYDRALAZINE HYDROCHLORIDE 20 MG/ML
5 INJECTION INTRAMUSCULAR; INTRAVENOUS ONCE AS NEEDED
Status: DISCONTINUED | OUTPATIENT
Start: 2025-06-08 | End: 2025-06-10 | Stop reason: HOSPADM

## 2025-06-08 RX ORDER — MISOPROSTOL 200 UG/1
800 TABLET ORAL ONCE AS NEEDED
Status: DISCONTINUED | OUTPATIENT
Start: 2025-06-08 | End: 2025-06-10 | Stop reason: HOSPADM

## 2025-06-08 RX ORDER — IBUPROFEN 600 MG/1
600 TABLET, FILM COATED ORAL EVERY 6 HOURS
Status: DISCONTINUED | OUTPATIENT
Start: 2025-06-08 | End: 2025-06-10 | Stop reason: HOSPADM

## 2025-06-08 RX ORDER — DIPHENHYDRAMINE HCL 25 MG
25 CAPSULE ORAL EVERY 4 HOURS PRN
Status: DISCONTINUED | OUTPATIENT
Start: 2025-06-08 | End: 2025-06-10 | Stop reason: HOSPADM

## 2025-06-08 RX ORDER — TRAMADOL HYDROCHLORIDE 50 MG/1
50 TABLET, FILM COATED ORAL EVERY 6 HOURS PRN
Status: DISCONTINUED | OUTPATIENT
Start: 2025-06-08 | End: 2025-06-10

## 2025-06-08 RX ORDER — ACETAMINOPHEN 325 MG/1
975 TABLET ORAL EVERY 6 HOURS
Status: DISCONTINUED | OUTPATIENT
Start: 2025-06-08 | End: 2025-06-10 | Stop reason: HOSPADM

## 2025-06-08 RX ORDER — NALOXONE HYDROCHLORIDE 0.4 MG/ML
0.1 INJECTION, SOLUTION INTRAMUSCULAR; INTRAVENOUS; SUBCUTANEOUS EVERY 5 MIN PRN
Status: DISCONTINUED | OUTPATIENT
Start: 2025-06-08 | End: 2025-06-10 | Stop reason: HOSPADM

## 2025-06-08 RX ORDER — ACETAMINOPHEN 500 MG
1000 TABLET ORAL EVERY 6 HOURS PRN
Qty: 60 TABLET | Refills: 0 | Status: ON HOLD | OUTPATIENT
Start: 2025-06-08

## 2025-06-08 RX ORDER — ENOXAPARIN SODIUM 100 MG/ML
40 INJECTION SUBCUTANEOUS DAILY
Status: DISCONTINUED | OUTPATIENT
Start: 2025-06-08 | End: 2025-06-10 | Stop reason: HOSPADM

## 2025-06-08 RX ORDER — DIPHENHYDRAMINE HYDROCHLORIDE 50 MG/ML
25 INJECTION, SOLUTION INTRAMUSCULAR; INTRAVENOUS EVERY 4 HOURS PRN
Status: DISCONTINUED | OUTPATIENT
Start: 2025-06-08 | End: 2025-06-10 | Stop reason: HOSPADM

## 2025-06-08 RX ORDER — IBUPROFEN 600 MG/1
600 TABLET, FILM COATED ORAL EVERY 6 HOURS PRN
Qty: 30 TABLET | Refills: 0 | Status: ON HOLD | OUTPATIENT
Start: 2025-06-08

## 2025-06-08 RX ORDER — LABETALOL HYDROCHLORIDE 5 MG/ML
20 INJECTION, SOLUTION INTRAVENOUS ONCE AS NEEDED
Status: DISCONTINUED | OUTPATIENT
Start: 2025-06-08 | End: 2025-06-10 | Stop reason: HOSPADM

## 2025-06-08 RX ORDER — LOPERAMIDE HYDROCHLORIDE 2 MG/1
4 CAPSULE ORAL EVERY 2 HOUR PRN
Status: DISCONTINUED | OUTPATIENT
Start: 2025-06-08 | End: 2025-06-10 | Stop reason: HOSPADM

## 2025-06-08 RX ORDER — OXYTOCIN/0.9 % SODIUM CHLORIDE 30/500 ML
60 PLASTIC BAG, INJECTION (ML) INTRAVENOUS ONCE AS NEEDED
Status: DISCONTINUED | OUTPATIENT
Start: 2025-06-08 | End: 2025-06-10 | Stop reason: HOSPADM

## 2025-06-08 RX ADMIN — ACETAMINOPHEN 975 MG: 325 TABLET ORAL at 06:01

## 2025-06-08 RX ADMIN — ENOXAPARIN SODIUM 40 MG: 100 INJECTION SUBCUTANEOUS at 22:29

## 2025-06-08 RX ADMIN — ACETAMINOPHEN 975 MG: 325 TABLET, FILM COATED ORAL at 18:34

## 2025-06-08 RX ADMIN — IBUPROFEN 600 MG: 600 TABLET ORAL at 18:34

## 2025-06-08 RX ADMIN — IBUPROFEN 600 MG: 600 TABLET ORAL at 12:56

## 2025-06-08 RX ADMIN — ACETAMINOPHEN 975 MG: 325 TABLET, FILM COATED ORAL at 12:56

## 2025-06-08 RX ADMIN — IBUPROFEN 600 MG: 600 TABLET ORAL at 02:50

## 2025-06-08 RX ADMIN — LIDOCAINE 1 PATCH: 4 PATCH TOPICAL at 13:23

## 2025-06-08 SDOH — SOCIAL STABILITY: SOCIAL INSECURITY: WITHIN THE LAST YEAR, HAVE YOU BEEN AFRAID OF YOUR PARTNER OR EX-PARTNER?: NO

## 2025-06-08 SDOH — ECONOMIC STABILITY: FOOD INSECURITY: WITHIN THE PAST 12 MONTHS, THE FOOD YOU BOUGHT JUST DIDN'T LAST AND YOU DIDN'T HAVE MONEY TO GET MORE.: NEVER TRUE

## 2025-06-08 SDOH — SOCIAL STABILITY: SOCIAL INSECURITY: DO YOU FEEL UNSAFE GOING BACK TO THE PLACE WHERE YOU ARE LIVING?: NO

## 2025-06-08 SDOH — SOCIAL STABILITY: SOCIAL INSECURITY: HAVE YOU HAD THOUGHTS OF HARMING ANYONE ELSE?: NO

## 2025-06-08 SDOH — ECONOMIC STABILITY: FOOD INSECURITY: WITHIN THE PAST 12 MONTHS, YOU WORRIED THAT YOUR FOOD WOULD RUN OUT BEFORE YOU GOT THE MONEY TO BUY MORE.: NEVER TRUE

## 2025-06-08 SDOH — SOCIAL STABILITY: SOCIAL INSECURITY: DOES ANYONE TRY TO KEEP YOU FROM HAVING/CONTACTING OTHER FRIENDS OR DOING THINGS OUTSIDE YOUR HOME?: NO

## 2025-06-08 SDOH — SOCIAL STABILITY: SOCIAL INSECURITY: WITHIN THE LAST YEAR, HAVE YOU BEEN HUMILIATED OR EMOTIONALLY ABUSED IN OTHER WAYS BY YOUR PARTNER OR EX-PARTNER?: NO

## 2025-06-08 SDOH — SOCIAL STABILITY: SOCIAL INSECURITY: ARE THERE ANY APPARENT SIGNS OF INJURIES/BEHAVIORS THAT COULD BE RELATED TO ABUSE/NEGLECT?: NO

## 2025-06-08 SDOH — SOCIAL STABILITY: SOCIAL INSECURITY: WERE YOU ABLE TO COMPLETE ALL THE BEHAVIORAL HEALTH SCREENINGS?: YES

## 2025-06-08 SDOH — SOCIAL STABILITY: SOCIAL INSECURITY: ABUSE: ADULT

## 2025-06-08 SDOH — SOCIAL STABILITY: SOCIAL INSECURITY: ARE YOU OR HAVE YOU BEEN THREATENED OR ABUSED PHYSICALLY, EMOTIONALLY, OR SEXUALLY BY ANYONE?: NO

## 2025-06-08 SDOH — SOCIAL STABILITY: SOCIAL INSECURITY: DO YOU FEEL ANYONE HAS EXPLOITED OR TAKEN ADVANTAGE OF YOU FINANCIALLY OR OF YOUR PERSONAL PROPERTY?: NO

## 2025-06-08 SDOH — SOCIAL STABILITY: SOCIAL INSECURITY: HAVE YOU HAD ANY THOUGHTS OF HARMING ANYONE ELSE?: NO

## 2025-06-08 SDOH — SOCIAL STABILITY: SOCIAL INSECURITY: HAS ANYONE EVER THREATENED TO HURT YOUR FAMILY OR YOUR PETS?: NO

## 2025-06-08 ASSESSMENT — COGNITIVE AND FUNCTIONAL STATUS - GENERAL
PATIENT BASELINE BEDBOUND: NO
DAILY ACTIVITIY SCORE: 24
MOBILITY SCORE: 24

## 2025-06-08 ASSESSMENT — ACTIVITIES OF DAILY LIVING (ADL)
JUDGMENT_ADEQUATE_SAFELY_COMPLETE_DAILY_ACTIVITIES: YES
HEARING - RIGHT EAR: FUNCTIONAL
WALKS IN HOME: INDEPENDENT
LACK_OF_TRANSPORTATION: NO
FEEDING YOURSELF: INDEPENDENT
DRESSING YOURSELF: INDEPENDENT
HEARING - LEFT EAR: FUNCTIONAL
GROOMING: INDEPENDENT
PATIENT'S MEMORY ADEQUATE TO SAFELY COMPLETE DAILY ACTIVITIES?: YES
TOILETING: INDEPENDENT
ADEQUATE_TO_COMPLETE_ADL: YES
BATHING: INDEPENDENT

## 2025-06-08 ASSESSMENT — PAIN SCALES - GENERAL
PAINLEVEL_OUTOF10: 0 - NO PAIN
PAINLEVEL_OUTOF10: 8
PAINLEVEL_OUTOF10: 0 - NO PAIN
PAIN_LEVEL: 0
PAINLEVEL_OUTOF10: 8
PAINLEVEL_OUTOF10: 6

## 2025-06-08 ASSESSMENT — PAIN DESCRIPTION - DESCRIPTORS
DESCRIPTORS: CRAMPING
DESCRIPTORS: CRAMPING;SORE
DESCRIPTORS: BURNING;ACHING

## 2025-06-08 ASSESSMENT — LIFESTYLE VARIABLES
HOW OFTEN DO YOU HAVE A DRINK CONTAINING ALCOHOL: NEVER
AUDIT-C TOTAL SCORE: 0
AUDIT-C TOTAL SCORE: 0
HOW MANY STANDARD DRINKS CONTAINING ALCOHOL DO YOU HAVE ON A TYPICAL DAY: PATIENT DOES NOT DRINK
HOW OFTEN DO YOU HAVE 6 OR MORE DRINKS ON ONE OCCASION: NEVER
SKIP TO QUESTIONS 9-10: 1

## 2025-06-08 ASSESSMENT — PATIENT HEALTH QUESTIONNAIRE - PHQ9
SUM OF ALL RESPONSES TO PHQ9 QUESTIONS 1 & 2: 0
2. FEELING DOWN, DEPRESSED OR HOPELESS: NOT AT ALL
1. LITTLE INTEREST OR PLEASURE IN DOING THINGS: NOT AT ALL

## 2025-06-08 NOTE — PROGRESS NOTES
Postpartum Progress Note    Assessment/Plan   Ifeanyi Lowery is a 24 y.o., , who was admitted as transfer for baby in NICU. Admitted to [facility] for IOL in s/o FGR, delivered at 37w2d gestation and is now postpartum day 1 s/p , Low Transverse for cat 2 tracing.     Routine postpartum care  - meeting all postpartum and post-op milestones  - voiding spontaneously, passing flatus, ambulating, tolerating PO diet  - bonding with male infant in NICU  - pain well controlled on po medications - intolerance to oxycodone, tramadol prn ordered in place of this after shared decision-making discussion   - DVT Score (IF A SCORE IS NOT CALCULATING, MUST SELECT A BMI TO COMPLETE): 5 - encourage ambulation,  SCDs, and  ppx lovenox  - O+, Rhogam not indicated  - PPBC: interval IUD    gHTN  - diagnosed by 2 mild range BPs > 4 hours apart  - asymptomatic  - BP mild range x 1 postpartum, largely normotensive  - no meds  - HELLP labs negative and P:C too low to calculate  - will continue to monitor, discussed 3 day stay for gHTN and pt states understanding  - BP cuff for home     Acute on Chronic Blood Loss Anemia  - admission hgb 10.0 ->  mL -> POD#1 9.0  - PO Fe sent at DC     Maternal Well-Being  - emotional support provided     Feeding  - breastfeeding/pumping encouraged; lactation consult prn    Dispo:  anticipate d/c on POD #3 if BP well-controlled and meeting all postpartum milestones, for f/u 1 week for BP check, 2 weeks for incision check, and 1 month with Primary OB provider    KHALIF Daley    Assessment & Plan    Pregnancy Problems (from 24 to present)       Problem Noted Diagnosed Resolved    Encounter for elective induction of labor 2025 by Sirisha Tineo MD  No    Priority:  Medium       Fetal growth restriction antepartum ([facility]) 2025 by Kael Andrews MD  No    Priority:  Medium       Overview Signed 2025  1:32 PM by Kael Andrews MD   FGR based  on 5/28 US AC 7%, with EFW 10%, elevated doppler indices, recommendation for IOL at 37.0 with twice weekly NSTs.         Antepartum anemia 3/6/2025 by Kael Andrews MD  No    Priority:  Medium       Nausea and vomiting in pregnancy 12/10/2024 by Kael Andrews MD  No    Priority:  Medium       36 weeks gestation of pregnancy ([facility]) 11/12/2024 by Kael Andrews MD  No    Priority:  Medium       Overview Addendum 6/3/2025 10:52 AM by Kael Andrews MD   Desired provider in labor: [] CNM  [] Physician  [x] Blood Products: [x] Yes, accepts [] No, needs counseling  [x] Initial BMI: 24.39   [x] Prenatal Labs:   [] Cervical Cancer Screening up to date  [x] Rh status: POS  [x] Genetic Screening:  low risk, XY  [x] NT US: (11-13 wks) WNL  [x] Baby ASA (if indicated):  [x] Pregnancy dated by: LMP    [x] Anatomy US: (19-20 wks) WNL  [x] Federal Sterilization consent signed (if indicated): NA  [x] 1hr GCT at 24-28wks: WNL  [x] Rhogam (if indicated): N/A  [x] Fetal Surveillance (if indicated): NA  [x] Tdap (27-32 wks, may be given up to 36 wks if initial window missed):   [x] RSV (32-36 wks) (Sept. to end of Jan): NA  [x] Flu Vaccine: 11/12/24    [x] Breastfeeding: yes  [x] Postpartum Birth control method: Interval cu-IUD  [x] GBS at 36 - 37 wks: negative  [x] 39 weeks discussion of IOL vs. Expectant management: N/A  [x] Mode of delivery ( anticipated ): IOL               Subjective   Her pain is well controlled with current medications  She is passing flatus  She is ambulating well  She is tolerating a Adult diet Regular  She reports no breast or nursing problems  She denies emotional concerns today      Doing well, no concerns. Has intolerance to oxy so would like something else for pain.     Objective   Allergies:   Oxycodone         Last Vitals:  Temp Pulse Resp BP MAP Pulse Ox   36.3 °C (97.3 °F) 80 16 131/85   99 %     Vitals Min/Max Last 24 Hours:  Temp  Min: 36.3 °C (97.3 °F)  Max: 37 °C (98.6  °F)  Pulse  Min: 69  Max: 81  Resp  Min: 16  Max: 18  BP  Min: 118/69  Max: 131/85    Physical Exam:  General: well appearing, well-nourished, postpartum  Obstetric: abdomen soft/non-tender, fundus firm below umbilicus, lochia light, Pfannenstiel incision c/d/i  Skin: No rashes/lesions/erythema  Neuro: A/Ox3, conversational  GI: +BS, +flatus  Respiratory: Even and unlabored on RA, LSCTA BL  Cardiovascular: RRR, normal S1, S2  Extremities: No edema, discoloration, or pain in BLE, equal and palpable DP and PT pulses    Psych: appropriate mood and affect     Lab Data:  Labs in chart were reviewed.

## 2025-06-08 NOTE — LETTER
6/9/2025    To Whom It May Concern:    Ifeanyi Lowery is being followed for her pregnancy and postpartum at [facility]. Please excuse her from work starting on 06/04/2025. She was induced on 06/05/2025 and delivered on 06/07/2025. If you have any questions or concerns, please reach out to our office.       Sincerely,        Kael Andrews MD  [facility]

## 2025-06-08 NOTE — ANESTHESIA POSTPROCEDURE EVALUATION
Patient: Iefanyi Lowery    Procedure Summary       Date: 25 Room / Location: [facility] OB    Anesthesia Start:  Anesthesia Stop: 25    Procedure:  DELIVERY Diagnosis: (Fetal Intolerance of Labor)    Surgeons: Izaiah Mooney MD Responsible Provider: FLORINA Glez    Anesthesia Type: epidural ASA Status: 2            Anesthesia Type: epidural    Vitals Value Taken Time   /73 25 04:39   Temp 37.2 °C (99 °F) 25 04:39   Pulse 62 25 04:39   Resp 19 25 04:39   SpO2 100 % 25 04:37       Anesthesia Post Evaluation    Patient location during evaluation: bedside  Patient participation: complete - patient participated  Level of consciousness: awake and alert  Pain score: 0  Pain management: adequate  Airway patency: patent  Cardiovascular status: acceptable  Respiratory status: acceptable and room air  Hydration status: acceptable  Postoperative Nausea and Vomiting: none      Epidural catheter removed by nursing. No redness, swelling, or drainage at puncture site.    Complete resolution of numbness. Patient is able to lift legs, bend at the knees, and ambulate.    Patient denies problems with urination.    Patient denies nausea, headache or severe back pain.       There were no known notable events for this encounter.

## 2025-06-09 ENCOUNTER — PHARMACY VISIT (OUTPATIENT)
Dept: PHARMACY | Facility: CLINIC | Age: 25
End: 2025-06-09
Payer: COMMERCIAL

## 2025-06-09 PROBLEM — O13.3 GESTATIONAL HYPERTENSION, THIRD TRIMESTER (HHS-HCC): Status: ACTIVE | Noted: 2025-06-09

## 2025-06-09 PROBLEM — O13.3 GESTATIONAL HYPERTENSION, THIRD TRIMESTER (HHS-HCC): Status: RESOLVED | Noted: 2025-06-09 | Resolved: 2025-06-09

## 2025-06-09 PROCEDURE — 96372 THER/PROPH/DIAG INJ SC/IM: CPT | Performed by: NURSE PRACTITIONER

## 2025-06-09 PROCEDURE — G0378 HOSPITAL OBSERVATION PER HR: HCPCS

## 2025-06-09 PROCEDURE — RXMED WILLOW AMBULATORY MEDICATION CHARGE

## 2025-06-09 PROCEDURE — 2500000001 HC RX 250 WO HCPCS SELF ADMINISTERED DRUGS (ALT 637 FOR MEDICARE OP): Performed by: NURSE PRACTITIONER

## 2025-06-09 PROCEDURE — 2500000004 HC RX 250 GENERAL PHARMACY W/ HCPCS (ALT 636 FOR OP/ED): Performed by: NURSE PRACTITIONER

## 2025-06-09 PROCEDURE — 2500000001 HC RX 250 WO HCPCS SELF ADMINISTERED DRUGS (ALT 637 FOR MEDICARE OP): Performed by: STUDENT IN AN ORGANIZED HEALTH CARE EDUCATION/TRAINING PROGRAM

## 2025-06-09 RX ADMIN — ACETAMINOPHEN 975 MG: 325 TABLET, FILM COATED ORAL at 09:15

## 2025-06-09 RX ADMIN — IBUPROFEN 600 MG: 600 TABLET ORAL at 15:35

## 2025-06-09 RX ADMIN — ENOXAPARIN SODIUM 40 MG: 100 INJECTION SUBCUTANEOUS at 21:29

## 2025-06-09 RX ADMIN — ACETAMINOPHEN 975 MG: 325 TABLET, FILM COATED ORAL at 21:28

## 2025-06-09 RX ADMIN — ACETAMINOPHEN 975 MG: 325 TABLET, FILM COATED ORAL at 15:34

## 2025-06-09 RX ADMIN — IBUPROFEN 600 MG: 600 TABLET ORAL at 21:28

## 2025-06-09 RX ADMIN — TRAMADOL HYDROCHLORIDE 25 MG: 50 TABLET, COATED ORAL at 21:28

## 2025-06-09 RX ADMIN — ACETAMINOPHEN 975 MG: 325 TABLET, FILM COATED ORAL at 00:35

## 2025-06-09 RX ADMIN — IBUPROFEN 600 MG: 600 TABLET ORAL at 09:15

## 2025-06-09 RX ADMIN — IBUPROFEN 600 MG: 600 TABLET ORAL at 00:35

## 2025-06-09 ASSESSMENT — PAIN SCALES - GENERAL
PAINLEVEL_OUTOF10: 8
PAINLEVEL_OUTOF10: 2
PAINLEVEL_OUTOF10: 5 - MODERATE PAIN
PAINLEVEL_OUTOF10: 8
PAINLEVEL_OUTOF10: 3
PAINLEVEL_OUTOF10: 5 - MODERATE PAIN
PAINLEVEL_OUTOF10: 5 - MODERATE PAIN

## 2025-06-09 ASSESSMENT — PAIN - FUNCTIONAL ASSESSMENT
PAIN_FUNCTIONAL_ASSESSMENT: 0-10
PAIN_FUNCTIONAL_ASSESSMENT: 0-10

## 2025-06-09 ASSESSMENT — PAIN DESCRIPTION - DESCRIPTORS
DESCRIPTORS: ACHING;BURNING
DESCRIPTORS: BURNING;ACHING
DESCRIPTORS: BURNING;ACHING
DESCRIPTORS: ACHING;BURNING

## 2025-06-09 ASSESSMENT — PAIN DESCRIPTION - LOCATION
LOCATION: INCISION
LOCATION: INCISION

## 2025-06-09 NOTE — PROGRESS NOTES
Postpartum Progress Note    Assessment/Plan   Ifeanyi Lowery is a 24 y.o., , who was admitted as transfer for baby in NICU. Admitted to [facility] for IOL in s/o FGR, delivered at 37w2d gestation and is now postpartum day 2, s/p , Low Transverse for cat 2 tracing.     Routine Postpartum Care  Now PPD#2 s/p , Low Transverse on 2025  -Meeting postpartum milestones  - Voiding spontaneously, passing flatus, ambulating, tolerating PO diet   - Discussed importance of movement and bowel regimen  - Continue routine postpartum care  - Pain well controlled on po medications - intolerance to oxycodone, tramadol prn ordered in place of this after shared decision-making discussion   - bonding with male infant in NICU  -DVT Score (IF A SCORE IS NOT CALCULATING, MUST SELECT A BMI TO COMPLETE): 5 - encourage ambulation,  SCDs, and  ppx lovenox  -O POS, Rhogam not indicated  -PPBC: Interval IUD  - Hgb:   Results from last 7 days   Lab Units 25  0503 25  0636 25  2350   HEMOGLOBIN g/dL 9.0* 9.6* 9.7*      gHTN  - diagnosed by 2 mild range BPs > 4 hours apart  - asymptomatic  - BP mild range x 1 postpartum, largely normotensive  - no meds  - HELLP labs negative and P:C too low to calculate  - Doesn't agree with diagnosis but expresses understanding after reviewing multiple mild range blood pressure's during antepartum period.  - will continue to monitor, discussed 3 day admission for BP monitoring  - BP cuff for home monitoring BID     Acute on Chronic Blood Loss Anemia  - admission hgb 10.0 ->  mL -> POD#1 9.0  - PO Fe to be sent at DC     Maternal Well-Being  -Emotional support provided, postpartum course anticipatory guidance provided, reviewed normal expectations and maternal warning signs  -Provided family centered care, and addressed all questions and concerns    [place] Feeding  -The patient is breast feeding/pumping  -Counseled patient on importance of on demand feeding or  pumping atleast 8-10 times daily to promote adequate supply.  -Breastfeeding/pumping encouraged, lactation consult ordered, PRN    Contraception  - Interval IUD  - Education provided    Dispo: Anticipate d/c on POD #3 if BP well-controlled and meeting all postpartum milestones, for f/u 1 week for BP check, 2 weeks for incision check, and 1 month with Primary OB provider     ANDREA Orlando-CNP, CLC   25 7:35 AM  vocera    Assessment & Plan  Acute on chronic blood loss anemia     delivery delivered ([facility])    Gestational hypertension, third trimester ([facility]) (Resolved: 2025)    Pregnancy Problems (from 24 to present)       Problem Noted Diagnosed Resolved     delivery delivered ([facility]) 2025 by Charlotte Simon PA-C  No    Priority:  Medium       Encounter for elective induction of labor 2025 by Sirisha Tineo MD  No    Priority:  Medium       Fetal growth restriction antepartum ([facility]) 2025 by Kael Andrews MD  No    Priority:  Medium       Overview Signed 2025  1:32 PM by Kael Andrews MD   FGR based on  US AC 7%, with EFW 10%, elevated doppler indices, recommendation for IOL at 37.0 with twice weekly NSTs.         Antepartum anemia 3/6/2025 by Kael Andrews MD  No    Priority:  Medium       Nausea and vomiting in pregnancy 12/10/2024 by Kael Andrews MD  No    Priority:  Medium       Gestational hypertension ([facility]) 2024 by Kael Andrews MD  No    Priority:  Medium       Overview Signed 2024  4:56 PM by Kael Andrews MD   : Mild range blood pressure at 148/87         36 weeks gestation of pregnancy ([facility]) 2024 by Kael Andrews MD  No    Priority:  Medium       Overview Addendum 6/3/2025 10:52 AM by Kael Andrews MD   Desired provider in labor: [] CNM  [] Physician  [x] Blood Products: [x] Yes, accepts [] No, needs counseling  [x] Initial BMI: 24.39   [x] Prenatal Labs:   []  Cervical Cancer Screening up to date  [x] Rh status: POS  [x] Genetic Screening:  low risk, XY  [x] NT US: (11-13 wks) WNL  [x] Baby ASA (if indicated):  [x] Pregnancy dated by: LMP    [x] Anatomy US: (19-20 wks) WNL  [x] Federal Sterilization consent signed (if indicated): NA  [x] 1hr GCT at 24-28wks: WNL  [x] Rhogam (if indicated): N/A  [x] Fetal Surveillance (if indicated): NA  [x] Tdap (27-32 wks, may be given up to 36 wks if initial window missed):   [x] RSV (32-36 wks) (Sept. to end ): NA  [x] Flu Vaccine: 24    [x] Breastfeeding: yes  [x] Postpartum Birth control method: Interval cu-IUD  [x] GBS at 36 - 37 wks: negative  [x] 39 weeks discussion of IOL vs. Expectant management: N/A  [x] Mode of delivery ( anticipated ): IOL                 Subjective   The patient's pain is well controlled with current medication regimen. She denies chest pain, shortness of breath, headaches, vision changes, pain under right breast, fever, heavy vaginal bleeding, abdominal pain, dizziness, fatigue, or chills. She denies any breast concerns or emotional concerns at this time.     Ifeanyi Lowery is PPD#2 s/p  who reports feeling overall well.  Patient evaluated in NICU, she is holding baby skin to skin during our discussion. She denies any concerns today. No acute events overnight. She is ambulating and urinating without difficulty. She is tolerating an adult regular diet, passing flatus, and has not had a bowel movement yet.      Objective   Allergies:   Oxycodone         Last Vitals:  Temp Pulse Resp BP MAP Pulse Ox   37.2 °C (99 °F) 79 20 125/82   99 %     Vitals Min/Max Last 24 Hours:  Temp  Min: 36.3 °C (97.3 °F)  Max: 37.2 °C (99 °F)  Pulse  Min: 78  Max: 81  Resp  Min: 16  Max: 20  BP  Min: 118/69  Max: 131/85    Physical Exam:  Constitutional: examination reveals a well developed, well nourished, female, in no acute distress. She is alert, pleasant and cooperative.  Cardiac: warm and well perfused,  "regular rate, S1, S2 normal, no murmur, click, rub or gallop  Respiratory:  Even and unlabored breathing in room air, clear to auscultation bilaterally. No crackles or wheezes.  Fundus: Firm and below umbilicus  Derm: Skin color, texture, turgor normal. No rashes, or lesions.    Abdominal: soft, non-tender, non-distended, active bowel sounds x4, no masses, no organomegaly  : Fundus firm, midline at umbilicus, lochia light   Extremities: Symmetrical, no redness or tenderness in the calves or thighs, no edema or discoloration  Neurological: PERRLA. alert, oriented, normal speech, no focal findings or movement disorder noted.  Psychological: Appropriate mood and affect. Awake and alert; oriented to person, place, and time.   Skin: incision clean, dry, intact, well approximated, no redness, drainage, or warmth  with steri strips intact    Lab Data:  Labs in chart were reviewed.  No results found for: \"ABO\", \"LABRH\", \"ABSCRN\"  No results found for: \"WBC\", \"HGB\", \"HCT\", \"PLT\"  0   Lab Value Date/Time    GRPBSTREP SEE NOTE 05/30/2025 1335     0   Lab Value Date/Time    UTPCR  06/07/2025 0250      Comment:      One or more analytes used in this calculation is outside of the analytical measurement range. Calculation cannot be performed.     "

## 2025-06-09 NOTE — PROGRESS NOTES
06/09/25 1011   Discharge Planning   Home or Post Acute Services   (blood pressure monitor)     Patient meets criteria for home monitoring of blood pressure post discharge.  Reason:  current gestational hypertension. Met with patient to assess for availability of home BP monitor.  Patient does not have access to BP monitor at home and declined obtaining BP monitor from [place] [facility].   Patient  verbalized responsibility for obtaining her own BP monitor after discharge. Patient educated on importance of continuing to monitor BP at home, recording BP on home monitoring log and s/sx of when to call her provider.  Pt verbalized understanding the above information.

## 2025-06-10 VITALS
OXYGEN SATURATION: 96 % | WEIGHT: 153.22 LBS | HEART RATE: 96 BPM | TEMPERATURE: 97.9 F | DIASTOLIC BLOOD PRESSURE: 89 MMHG | SYSTOLIC BLOOD PRESSURE: 134 MMHG | RESPIRATION RATE: 18 BRPM | HEIGHT: 61 IN | BODY MASS INDEX: 28.93 KG/M2

## 2025-06-10 PROCEDURE — 99238 HOSP IP/OBS DSCHRG MGMT 30/<: CPT | Performed by: NURSE PRACTITIONER

## 2025-06-10 PROCEDURE — 2500000001 HC RX 250 WO HCPCS SELF ADMINISTERED DRUGS (ALT 637 FOR MEDICARE OP)

## 2025-06-10 PROCEDURE — G0378 HOSPITAL OBSERVATION PER HR: HCPCS

## 2025-06-10 RX ORDER — TRAMADOL HYDROCHLORIDE 50 MG/1
25 TABLET, FILM COATED ORAL EVERY 6 HOURS PRN
Status: DISCONTINUED | OUTPATIENT
Start: 2025-06-10 | End: 2025-06-10 | Stop reason: HOSPADM

## 2025-06-10 RX ADMIN — ACETAMINOPHEN 975 MG: 325 TABLET, FILM COATED ORAL at 04:54

## 2025-06-10 RX ADMIN — IBUPROFEN 600 MG: 600 TABLET ORAL at 10:48

## 2025-06-10 RX ADMIN — ACETAMINOPHEN 975 MG: 325 TABLET, FILM COATED ORAL at 10:48

## 2025-06-10 RX ADMIN — IBUPROFEN 600 MG: 600 TABLET ORAL at 04:54

## 2025-06-10 ASSESSMENT — PAIN SCALES - GENERAL
PAINLEVEL_OUTOF10: 4
PAINLEVEL_OUTOF10: 0 - NO PAIN

## 2025-06-10 NOTE — LACTATION NOTE
Lactation Consultant Note  Lactation Consultation  Reason for Consult: Follow-up assessment  Consultant Name: GAYE Hubbard RN IBCLC    Maternal Information  Has mother  before?: No  Exclusive Pump and Bottle Feed: No    Maternal Assessment  Breast Assessment: Medium, Symmetrical, Filling, Compressible  Nipple Assessment: Intact, Erect  [place] Assessment: Normal    Infant Assessment  Infant Behavior: Deep sleep    Feeding Assessment  Nutrition Source: Breastmilk  Feeding Method: Nursing at the breast  Latch Assessment: No latch achieved    LATCH TOOL       Breast Pump  Pump: Hospital grade electric pump, Double breast pumping  Frequency: 3-4 times per day  Duration: Maintain phase  Volume of Milk Production: 60  Units of Volume: mL per session    Other OB Lactation Tools       Patient Follow-up  Inpatient Lactation Follow-up Needed : No    Other OB Lactation Documentation  Maternal Risk Factors: Hypertension  Infant Risk Factors: Early term birth 37-39 weeks    Recommendations/Summary    I did not view a latch during this visit. The mother reports being able to latch her infant independently. She denies any difficulty with latching or pain/discomfort while breastfeeding. The mother is also occasionally pumping to relieve breast fullness. She desires to build up a milk supply so that dad can sometimes feed their infant. I suggested prioritizing putting the infant to the breast for the first 2 weeks and avoiding over stimulation with frequent pumping. We discussed the risks of engorgement and milk stasis. The mother has a hands-free breast pump for home and desires to get an additional hand-held pump. We discussed various pump options. All questions were answered and the mother is offered further lactation assistance as needed.

## 2025-06-10 NOTE — CARE PLAN
The patient's goals for the shift include prepare for discharge    The clinical goals for the shift include vital signs remain stable    Vital signs stable throughout the shift, lochia has been WNL, patient is without s/s of HDP. Patient is bonding well with her !    Problem: Postpartum  Goal: Experiences normal postpartum course  Outcome: Adequate for Discharge  Goal: Appropriate maternal -  bonding  Outcome: Adequate for Discharge  Goal: Establish and maintain infant feeding pattern for adequate nutrition  Outcome: Adequate for Discharge  Goal: Incisions, wounds, or drain sites healing without S/S of infection  Outcome: Adequate for Discharge  Goal: No s/sx infection  Outcome: Adequate for Discharge  Goal: No s/sx of hemorrhage  Outcome: Adequate for Discharge  Goal: Minimal s/sx of HDP and BP<160/110  Outcome: Adequate for Discharge     Problem: Pain - Adult  Goal: Verbalizes/displays adequate comfort level or baseline comfort level  Outcome: Adequate for Discharge     Problem: Safety - Adult  Goal: Free from fall injury  Outcome: Adequate for Discharge     Problem: Discharge Planning  Goal: Discharge to home or other facility with appropriate resources  Outcome: Adequate for Discharge

## 2025-06-10 NOTE — DISCHARGE SUMMARY
Discharge Summary    Ifeanyi Lowery is a 24 y.o. year old female , who delivered at 37w2d gestation via , Low Transverse after IOL in s/o failed induction for FGR, now PPD#3.    Admission Date: 2025  Discharge Date: 06/10/25       Discharge Diagnosis  , Low Transverse    Hospital Course  Delivery Date: 2025 1:30 AM  Delivery type: , Low Transverse   GA at delivery: 37w2d   Outcome: Living  Intrapartum complications: Fetal Intolerance  Feeding method: Breastfeeding Status: Yes     Procedures: none  Contraception at discharge: Defers to PPV, interested in IUD. We discussed pregnancy spacing of at least one year, abstaining from intercourse for 6wks, and the ability to become pregnant in the absence of regular menses. Pt verbalized understanding.    Lochia light. Pain well controlled. Mood stable. Denies HA, SOB, RUQ pain, vision changes.  Denies dizziness/lightheadedness, SOB, palpitations, extreme fatigue, heavy bleeding      Meeting all postpartum milestones. OK for DC today and follow up as below.   - Follow-up in 2-5 days for BP check  - Follow-up in 4-6wks with primary OGYN    Pertinent Physical Exam At Time of Discharge    General: Examination reveals a well developed, well nourished, female, in no acute distress. She is alert and cooperative.  HEENT: PERRLA. External ears normal. Nose normal, no erythema or discharge. Mouth and throat clear.  Lungs: clear to auscultation bilaterally.  Cardiac: regular rate and rhythm, S1, S2 normal, no murmur, click, rub or gallop.  Abdomen: soft, gravid, nontender, nondistended, no abnormal masses, no epigastric pain.  Incision: healing well, no drainage, no swelling, well approximated.  Fundus: firm, at umbilicus, and nontender.  Perineum: well healing.  Extremities: no redness or tenderness in the calves or thighs, no edema.  Neurological: alert, oriented, normal speech, no focal findings or movement disorder noted, DTRs normal and  "symmetrical.  Psychological: awake and alert; oriented to person, place, and time.    Vitals  /84   Pulse 72   Temp 37.3 °C (99.1 °F) (Temporal)   Resp 18   Ht 1.549 m (5' 0.98\")   Wt 69.5 kg (153 lb 3.5 oz)   LMP 09/19/2024 (Exact Date)   SpO2 96%   Breastfeeding Yes   BMI 28.97 kg/m²      Discharge Meds     Your medication list        CONTINUE taking these medications        Instructions Last Dose Given Next Dose Due   acetaminophen 500 mg tablet  Commonly known as: Tylenol      Take 2 tablets (1,000 mg) by mouth every 6 hours if needed for mild pain (1 - 3).       ferrous sulfate 325 (65 Fe) mg EC tablet      Take 1 tablet by mouth once daily with breakfast. Do not crush, chew, or split.       ibuprofen 600 mg tablet      Take 1 tablet (600 mg) by mouth every 6 hours if needed for mild pain (1 - 3).       PRENATAL 19 ORAL                     Complications Requiring Follow-Up    Acute on chronic blood loss anemia  -  - Most recent hgb 9.0  - asymptomatic, VSS  - discussed s/s of anemia  - Patient states she has RX for iron at home she will take    gHTN  - diagnosed by 2 mild range BPs > 4 hours apart  - asymptomatic  - BP mild range x 1 postpartum, largely normotensive  - no meds  - HELLP labs negative and P:C too low to calculate  - The signs and symptoms of PEC were reviewed with the patient, including unrelenting headache, vision changes/blurred vision, and pain underneath the right breast.   - BP cuff for home for checking BP BID. Pt instructed to call primary OB if SBP > 160 or DBP > 110.      Test Results Pending At Discharge  Pending Labs       No current pending labs.            Outpatient Follow-Up    I spent 20 minutes in the professional and overall care of this patient.      ANDREA Askew-CNP  "

## 2025-06-12 ENCOUNTER — OFFICE VISIT (OUTPATIENT)
Dept: OBSTETRICS AND GYNECOLOGY | Facility: CLINIC | Age: 25
End: 2025-06-12
Payer: COMMERCIAL

## 2025-06-12 VITALS — SYSTOLIC BLOOD PRESSURE: 145 MMHG | DIASTOLIC BLOOD PRESSURE: 89 MMHG | BODY MASS INDEX: 27.15 KG/M2 | WEIGHT: 143.6 LBS

## 2025-06-12 LAB
LABORATORY COMMENT REPORT: NORMAL
PATH REPORT.FINAL DX SPEC: NORMAL
PATH REPORT.GROSS SPEC: NORMAL
PATH REPORT.RELEVANT HX SPEC: NORMAL
PATH REPORT.TOTAL CANCER: NORMAL

## 2025-06-12 PROCEDURE — 3079F DIAST BP 80-89 MM HG: CPT | Performed by: NURSE PRACTITIONER

## 2025-06-12 PROCEDURE — 3075F SYST BP GE 130 - 139MM HG: CPT | Performed by: NURSE PRACTITIONER

## 2025-06-12 PROCEDURE — 99213 OFFICE O/P EST LOW 20 MIN: CPT | Performed by: NURSE PRACTITIONER

## 2025-06-12 PROCEDURE — 1036F TOBACCO NON-USER: CPT | Performed by: NURSE PRACTITIONER

## 2025-06-12 ASSESSMENT — ENCOUNTER SYMPTOMS: HYPERTENSION: 1

## 2025-06-12 NOTE — H&P
OB Admission H&P    Assessment/Plan    Ifeanyi Lowery is a 24 y.o., , who was admitted as transfer for baby in NICU. Admitted to [facility] for IOL in s/o FGR, delivered at 37w2d gestation and is now postpartum day 1 s/p , Low Transverse for cat 2 tracing.      Routine postpartum care  - meeting all postpartum and post-op milestones  - voiding spontaneously, passing flatus, ambulating, tolerating PO diet  - bonding with male infant in NICU  - pain well controlled on po medications - intolerance to oxycodone, tramadol prn ordered in place of this after shared decision-making discussion   - DVT Score (IF A SCORE IS NOT CALCULATING, MUST SELECT A BMI TO COMPLETE): 5 - encourage ambulation,  SCDs, and  ppx lovenox  - O+, Rhogam not indicated  - PPBC: interval IUD     gHTN  - diagnosed by 2 mild range BPs > 4 hours apart  - asymptomatic  - BP mild range x 1 postpartum, largely normotensive  - no meds  - HELLP labs negative and P:C too low to calculate  - will continue to monitor, discussed 3 day stay for gHTN and pt states understanding  - BP cuff for home      Acute on Chronic Blood Loss Anemia  - admission hgb 10.0 ->  mL -> POD#1 9.0  - PO Fe sent at DC      Maternal Well-Being  - emotional support provided      Feeding  - breastfeeding/pumping encouraged; lactation consult prn     Dispo:  anticipate d/c on POD #3 if BP well-controlled and meeting all postpartum milestones, for f/u 1 week for BP check, 2 weeks for incision check, and 1 month with Primary OB provider     Charlotte Simon PA-C  Vocera    Pregnancy Problems (from 24 to present)       Problem Noted Diagnosed Resolved     delivery delivered ([facility]) 2025 by Charlotte Simon PA-C  No    Priority:  Medium       Encounter for elective induction of labor 2025 by Sirisha Tineo MD  No    Priority:  Medium       Fetal growth restriction antepartum ([facility]) 2025 by Kael Andrews MD  No    Priority:   Medium       Overview Signed 5/30/2025  1:32 PM by Kael Andrews MD   FGR based on 5/28 US AC 7%, with EFW 10%, elevated doppler indices, recommendation for IOL at 37.0 with twice weekly NSTs.         Antepartum anemia 3/6/2025 by Kael Andrews MD  No    Priority:  Medium       Nausea and vomiting in pregnancy 12/10/2024 by Kael Andrews MD  No    Priority:  Medium       [facility] 11/12/2024 by Kael Andrews MD  No    Priority:  Medium       Overview Addendum 6/9/2025  9:17 AM by ANDREA Diana-CNP   11/12: Mild range blood pressure at 148/87    - diagnosed by 2 mild range BPs > 4 hours apart  - asymptomatic  - BP mild range x 1 postpartum, largely normotensive  - no meds  - HELLP labs negative and P:C too low to calculate  - BP cuff for home monitoring BID         36 weeks gestation of pregnancy ([facility]) 11/12/2024 by Kael Andrews MD  No    Priority:  Medium       Overview Addendum 6/3/2025 10:52 AM by Kael Andrews MD   Desired provider in labor: [] CNM  [] Physician  [x] Blood Products: [x] Yes, accepts [] No, needs counseling  [x] Initial BMI: 24.39   [x] Prenatal Labs:   [] Cervical Cancer Screening up to date  [x] Rh status: POS  [x] Genetic Screening:  low risk, XY  [x] NT US: (11-13 wks) WNL  [x] Baby ASA (if indicated):  [x] Pregnancy dated by: LMP    [x] Anatomy US: (19-20 wks) WNL  [x] Federal Sterilization consent signed (if indicated): NA  [x] 1hr GCT at 24-28wks: WNL  [x] Rhogam (if indicated): N/A  [x] Fetal Surveillance (if indicated): NA  [x] Tdap (27-32 wks, may be given up to 36 wks if initial window missed):   [x] RSV (32-36 wks) (Sept. to end of Jan): NA  [x] Flu Vaccine: 11/12/24    [x] Breastfeeding: yes  [x] Postpartum Birth control method: Interval cu-IUD  [x] GBS at 36 - 37 wks: negative  [x] 39 weeks discussion of IOL vs. Expectant management: N/A  [x] Mode of delivery ( anticipated ): IOL         Gestational  hypertension, third trimester ([facility]) 2025 by RICHELLE Diana  2025 by RICHELLE Diana    Priority:  Medium       Overview Signed 2025  9:16 AM by RICHELLE Diana   - diagnosed by 2 mild range BPs > 4 hours apart  - asymptomatic  - BP mild range x 1 postpartum, largely normotensive  - no meds  - HELLP labs negative and P:C too low to calculate  - BP cuff for home monitoring BID                 Subjective   Ifeanyi Lowery is a 24 y.o., , who was admitted as transfer for baby in NICU. Admitted to [facility] for IOL in s/o FGR, delivered at 37w2d gestation and is now postpartum day 1 s/p , Low Transverse for cat 2 tracing.     Patient seen at bedside - doing well and no acute events overnight. Pain well-controlled on current regimen, lochia light, voiding spontaneously, passing flatus, ambulating independently, and tolerating PO.     OB History    Para Term  AB Living   2 1 1 0 1 1   SAB IAB Ectopic Multiple Live Births   1 0 0 0 1      # Outcome Date GA Lbr German/2nd Weight Sex Type Anes PTL Lv   2 Term 25 37w2d  2.29 kg M CS-LTranv EPI  CHIO      Complications: Fetal Intolerance      Name: Kyle Lowery      Apgar1: 7  Apgar5: 9   1 SAB                Surgical History[1]    Social History     Tobacco Use    Smoking status: Never     Passive exposure: Never    Smokeless tobacco: Never   Substance Use Topics    Alcohol use: Not Currently       Allergies[2]    Prescriptions Prior to Admission[3]  Objective     Last Vitals  Temp Pulse Resp BP MAP O2 Sat   36.6 °C (97.9 °F) 96 18 134/89 104 96 %     Blood Pressures         2025  2033 2025  2322 6/10/2025  0317 6/10/2025  0735 6/10/2025  1209    BP: 123/81 129/82 135/86 136/84 134/89             Physical Exam  General: well appearing, well-nourished, postpartum  Obstetric: abdomen soft/non-tender, fundus firm below umbilicus, lochia light, Pfannenstiel incision c/d/i  Skin: No  rashes/lesions/erythema  Neuro: A/Ox3, conversational  GI: +BS, +flatus  Respiratory: Even and unlabored on RA, LSCTA BL  Cardiovascular: RRR, normal S1, S2  Extremities: No edema, discoloration, or pain in BLE, equal and palpable DP and PT pulses    Psych: appropriate mood and affect      Labs in chart were reviewed.             [1]   Past Surgical History:  Procedure Laterality Date    NO PAST SURGERIES     [2]   Allergies  Allergen Reactions    Oxycodone Nausea/vomiting   [3]   No medications prior to admission.

## 2025-06-12 NOTE — PROGRESS NOTES
Subjective   Patient ID: Ifeanyi Lowery is a 24 y.o. female who presents for Hypertension (Patient here for elevated blood pressure check/Patient here for incision check ).  Hypertension      Patient's status post  on  for fetal intolerance of labor and second stage.  Patient also had mildly elevated blood pressure.    Patient is here today for blood pressure check and incision check.    Patient is asymptomatic with no headache, edema or upper right quadrant pain.  She is getting good support at home and breast-feeding without difficulty.    She does not have a blood pressure cuff at home other than a manual cuff.  She is going to get 1 today and start checking her blood pressure as she had been instructed.  Review of Systems   All other systems reviewed and are negative.      Objective   Physical Exam  Constitutional:       Appearance: She is normal weight.   HENT:      Head: Normocephalic.   Pulmonary:      Effort: Pulmonary effort is normal.   Abdominal:      General: A surgical scar is present.      Palpations: Abdomen is soft.      Comments: Incision well-approximated no redness and no drainage noted.  Steri-Strips remain intact.  No induration noted.   Musculoskeletal:      Cervical back: Normal range of motion.      Right lower leg: No edema.      Left lower leg: No edema.   Skin:     General: Skin is warm and dry.   Neurological:      Mental Status: She is alert.   Psychiatric:         Mood and Affect: Mood normal.           Assessment/Plan   Problem List Items Addressed This Visit    None  Visit Diagnoses         Codes      Gestational hypertension without significant proteinuria, postpartum ([facility])    -  Primary O13.5      Encounter for  visit     Z39.2        Patient will get blood pressure cuff and she is aware of the guidelines to follow in calling with any problems.  Follow-up with general postpartum visit scheduled         ANDREA Shepherd-CNP 25 11:20 AM

## 2025-06-24 ENCOUNTER — APPOINTMENT (OUTPATIENT)
Dept: OBSTETRICS AND GYNECOLOGY | Facility: CLINIC | Age: 25
End: 2025-06-24
Payer: COMMERCIAL

## 2025-06-25 ENCOUNTER — APPOINTMENT (OUTPATIENT)
Dept: OBSTETRICS AND GYNECOLOGY | Facility: CLINIC | Age: 25
End: 2025-06-25
Payer: COMMERCIAL

## 2025-06-25 VITALS — BODY MASS INDEX: 26.13 KG/M2 | WEIGHT: 138.2 LBS | SYSTOLIC BLOOD PRESSURE: 133 MMHG | DIASTOLIC BLOOD PRESSURE: 92 MMHG

## 2025-06-25 ASSESSMENT — EDINBURGH POSTNATAL DEPRESSION SCALE (EPDS)
I HAVE FELT SAD OR MISERABLE: NO, NOT AT ALL
I HAVE BEEN SO UNHAPPY THAT I HAVE BEEN CRYING: NO, NEVER
I HAVE FELT SCARED OR PANICKY FOR NO GOOD REASON: NO, NOT AT ALL
I HAVE BEEN ANXIOUS OR WORRIED FOR NO GOOD REASON: NO, NOT AT ALL
THE THOUGHT OF HARMING MYSELF HAS OCCURRED TO ME: NEVER
I HAVE BEEN ABLE TO LAUGH AND SEE THE FUNNY SIDE OF THINGS: AS MUCH AS I ALWAYS COULD
I HAVE BLAMED MYSELF UNNECESSARILY WHEN THINGS WENT WRONG: NO, NEVER
I HAVE BEEN SO UNHAPPY THAT I HAVE HAD DIFFICULTY SLEEPING: NOT AT ALL
I HAVE LOOKED FORWARD WITH ENJOYMENT TO THINGS: AS MUCH AS I EVER DID
TOTAL SCORE: 0
THINGS HAVE BEEN GETTING ON TOP OF ME: NO, I HAVE BEEN COPING AS WELL AS EVER

## 2025-06-25 ASSESSMENT — PAIN SCALES - GENERAL: PAINLEVEL_OUTOF10: 0-NO PAIN

## 2025-06-27 NOTE — PROGRESS NOTES
Subjective   Patient ID: Ifeanyi Lowery is a 24 y.o. female who presents for Postpartum Follow-up.  Patient here for postpartum visit.  No acute complaints breast-feeding without difficulty no incisional complaints.  Would like IUD for contraception.        Review of Systems   All other systems reviewed and are negative.      Objective   Physical Exam  Vitals reviewed.   Constitutional:       General: She is not in acute distress.     Appearance: She is not ill-appearing.   Pulmonary:      Effort: Pulmonary effort is normal.   Abdominal:      General: A surgical scar is present. There is no distension.      Palpations: Abdomen is soft.      Tenderness: There is no abdominal tenderness.      Hernia: No hernia is present.      Comments: Well approximated low transverse incision   Skin:     Coloration: Skin is not pale.   Neurological:      Mental Status: She is alert.       Postpartum Depression: Low Risk  (2025)    [place]  Depression Scale     Last EPDS Total Score: 0     Last EPDS Self Harm Result: Never         Assessment/Plan   Diagnoses and all orders for this visit:  Encounter for postpartum visit    Patient here for postpartum visit after  delivery.  No incisional complaints patient is recovering well.  EPDS score is 0.  Patient to follow-up in 1 month for IUD placement.       Kael Andrews MD 25 5:09 PM

## 2025-06-30 NOTE — DISCHARGE SUMMARY
Discharge Summary    Admission Date: 2025  Discharge Date: 2025    Discharge Diagnosis  Encounter for elective induction of labor    Hospital Course  Delivery Date: 2025 1:30 AM  Delivery type: , Low Transverse   GA at delivery: 37w2d  Outcome: Living  Anesthesia during delivery: Epidural  Intrapartum complications: Fetal Intolerance  Feeding method: Breastfeeding Status: Yes     Procedures: none  Contraception at discharge: none    Transferred to [facility] to be with her .    Pertinent Physical Exam At Time of Discharge  General: Examination reveals a well developed, well nourished, female, in no acute distress. She is alert and cooperative.  Lungs: unlabored.  Abdomen: soft, gravid, nontender, nondistended, no abnormal masses, no epigastric pain.  Fundus: firm, below umbilicus, and nontender.  Extremities: no redness or tenderness in the calves or thighs, no edema.        Last Vitals:  Temp Pulse Resp BP MAP Pulse Ox   37 °C (98.6 °F) 81 18 118/69 88 97 %     Discharge Meds     Your medication list        START taking these medications        Instructions Last Dose Given Next Dose Due   acetaminophen 500 mg tablet  Commonly known as: Tylenol      Take 2 tablets (1,000 mg) by mouth every 6 hours if needed for mild pain (1 - 3).       ibuprofen 600 mg tablet      Take 1 tablet (600 mg) by mouth every 6 hours if needed for mild pain (1 - 3).              ASK your doctor about these medications        Instructions Last Dose Given Next Dose Due   ferrous sulfate 325 (65 Fe) mg EC tablet      Take 1 tablet by mouth once daily with breakfast. Do not crush, chew, or split.       PRENATAL 19 ORAL                     Where to Get Your Medications        These medications were sent to [facility]  [address], [address], [place] [facility] [id number]      Hours: 8 AM to 6 PM Mon-Fri, 9 AM to 1 PM Saturday Phone: [phone]   acetaminophen 500 mg tablet  ibuprofen 600 mg tablet           Complications Requiring Follow-Up  None    Test Results Pending At Discharge  Pending Labs       No current pending labs.            Outpatient Follow-Up  Future Appointments   Date Time Provider Department Center   7/16/2025  2:30 PM Kael Andrews MD [id number]       I spent <30 minutes in the professional and overall care of this patient.      Kael Andrews MD

## 2025-07-15 ENCOUNTER — APPOINTMENT (OUTPATIENT)
Dept: OBSTETRICS AND GYNECOLOGY | Facility: CLINIC | Age: 25
End: 2025-07-15
Payer: COMMERCIAL

## 2025-07-15 VITALS — WEIGHT: 135.4 LBS | SYSTOLIC BLOOD PRESSURE: 129 MMHG | BODY MASS INDEX: 25.6 KG/M2 | DIASTOLIC BLOOD PRESSURE: 84 MMHG

## 2025-07-15 DIAGNOSIS — Z30.09 CONTRACEPTIVE EDUCATION: ICD-10-CM

## 2025-07-15 DIAGNOSIS — Z48.89 POSTOPERATIVE VISIT: ICD-10-CM

## 2025-07-15 PROCEDURE — 3074F SYST BP LT 130 MM HG: CPT | Performed by: STUDENT IN AN ORGANIZED HEALTH CARE EDUCATION/TRAINING PROGRAM

## 2025-07-15 PROCEDURE — 3079F DIAST BP 80-89 MM HG: CPT | Performed by: STUDENT IN AN ORGANIZED HEALTH CARE EDUCATION/TRAINING PROGRAM

## 2025-07-15 PROCEDURE — 99213 OFFICE O/P EST LOW 20 MIN: CPT | Performed by: STUDENT IN AN ORGANIZED HEALTH CARE EDUCATION/TRAINING PROGRAM

## 2025-07-15 PROCEDURE — 1036F TOBACCO NON-USER: CPT | Performed by: STUDENT IN AN ORGANIZED HEALTH CARE EDUCATION/TRAINING PROGRAM

## 2025-07-15 ASSESSMENT — EDINBURGH POSTNATAL DEPRESSION SCALE (EPDS)
THE THOUGHT OF HARMING MYSELF HAS OCCURRED TO ME: NEVER
THINGS HAVE BEEN GETTING ON TOP OF ME: NO, I HAVE BEEN COPING AS WELL AS EVER
I HAVE BEEN ANXIOUS OR WORRIED FOR NO GOOD REASON: NO, NOT AT ALL
I HAVE BEEN SO UNHAPPY THAT I HAVE BEEN CRYING: NO, NEVER
I HAVE LOOKED FORWARD WITH ENJOYMENT TO THINGS: AS MUCH AS I EVER DID
I HAVE FELT SCARED OR PANICKY FOR NO GOOD REASON: NO, NOT AT ALL
I HAVE FELT SAD OR MISERABLE: NO, NOT AT ALL
I HAVE BEEN ABLE TO LAUGH AND SEE THE FUNNY SIDE OF THINGS: AS MUCH AS I ALWAYS COULD
I HAVE BEEN SO UNHAPPY THAT I HAVE HAD DIFFICULTY SLEEPING: NOT AT ALL
I HAVE BLAMED MYSELF UNNECESSARILY WHEN THINGS WENT WRONG: NO, NEVER
TOTAL SCORE: 0

## 2025-07-15 ASSESSMENT — PAIN SCALES - GENERAL: PAINLEVEL_OUTOF10: 0-NO PAIN

## 2025-07-15 NOTE — PROGRESS NOTES
Subjective   Patient ID: Ifeanyi Lowery is a 24 y.o. female who presents for Postpartum Follow-up.  Patient is here for IUD placement however upon further reflection patient would prefer to defer IUD placement at this time.  Patient worried that her recovery may be derailed by the addition of either a Mirena or a ParaGard IUD.  Patient prefers to use condoms and withdrawal for contraception at this time.        Review of Systems   All other systems reviewed and are negative.      Objective   Physical Exam  Vitals reviewed.   Constitutional:       General: She is not in acute distress.     Appearance: She is not ill-appearing.   Pulmonary:      Effort: Pulmonary effort is normal.   Abdominal:      General: A surgical scar is present. There is no distension.      Palpations: Abdomen is soft.      Tenderness: There is no abdominal tenderness.      Hernia: No hernia is present.      Comments: Well-healing low transverse incision   Skin:     Coloration: Skin is not pale.   Neurological:      Mental Status: She is alert.         Assessment/Plan   Diagnoses and all orders for this visit:  Encounter for postpartum visit  Postoperative visit  Contraceptive education    Postpartum patient here for contraceptive visit.  Surgical incision was examined and is healing well.  Patient had been planning on a ParaGard IUD but has decided to decline.  Prefers to use condoms and/or withdrawal.  Counseled patient on risks and benefits of the withdrawal method.  Patient and her partner expressed understanding.  Follow-up in 3 months for annual visit.       Kael Andrews MD 07/15/25 4:17 PM

## 2025-07-16 ENCOUNTER — APPOINTMENT (OUTPATIENT)
Dept: OBSTETRICS AND GYNECOLOGY | Facility: CLINIC | Age: 25
End: 2025-07-16
Payer: COMMERCIAL

## 2025-10-08 ENCOUNTER — APPOINTMENT (OUTPATIENT)
Dept: OBSTETRICS AND GYNECOLOGY | Facility: CLINIC | Age: 25
End: 2025-10-08
Payer: COMMERCIAL

## (undated) DEVICE — SUTURE, MONOCRYL, 3-0, 36 IN, CT-1, UNDYED

## (undated) DEVICE — CATHETER TRAY, SURESTEP, 16FR, URINE METER W/STATLOCK

## (undated) DEVICE — SUTURE, VICRYL, 2-0, 27 IN, CT-1, VIOLET

## (undated) DEVICE — STRAP, VELCRO, BODY, 4 X 60IN, NS

## (undated) DEVICE — SUTURE, MONOCRYL, 1, 36 IN, CTX, VIOLET

## (undated) DEVICE — Device

## (undated) DEVICE — SOLUTION, IRRIGATION, SODIUM CHLORIDE 0.9%, 1000 ML, POUR BOTTLE

## (undated) DEVICE — SUTURE, VICRYL 0, 36 IN, CT-1, VIOLET

## (undated) DEVICE — SUTURE, VICRYL, 4-0, 27 IN, KS, UNDYED

## (undated) DEVICE — GOWN, SURGICAL, SMARTGOWN, XLARGE, STERILE

## (undated) DEVICE — COVER HANDLE LIGHT, STERIS, BLUE, STERILE

## (undated) DEVICE — DRAPE PACK, CESAREAN SECTION, CUSTOM, UHC

## (undated) DEVICE — GLOVE, SURGICAL, PROTEXIS PI , 7.5, PF, LF

## (undated) DEVICE — DRESSING, MEPILEX BORDER, POST-OP AG, 4 X 10 IN